# Patient Record
Sex: FEMALE | Race: WHITE
[De-identification: names, ages, dates, MRNs, and addresses within clinical notes are randomized per-mention and may not be internally consistent; named-entity substitution may affect disease eponyms.]

---

## 2018-10-16 ENCOUNTER — HOSPITAL ENCOUNTER (EMERGENCY)
Dept: HOSPITAL 25 - UCCORT | Age: 23
Discharge: HOME | End: 2018-10-16
Payer: COMMERCIAL

## 2018-10-16 VITALS — SYSTOLIC BLOOD PRESSURE: 109 MMHG | DIASTOLIC BLOOD PRESSURE: 63 MMHG

## 2018-10-16 DIAGNOSIS — Z88.1: ICD-10-CM
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
## 2018-10-16 DIAGNOSIS — Z91.013: ICD-10-CM

## 2018-10-16 DIAGNOSIS — S00.01XA: Primary | ICD-10-CM

## 2018-10-16 DIAGNOSIS — F41.9: ICD-10-CM

## 2018-10-16 DIAGNOSIS — F31.9: ICD-10-CM

## 2018-10-16 DIAGNOSIS — Z91.040: ICD-10-CM

## 2018-10-16 DIAGNOSIS — F17.210: ICD-10-CM

## 2018-10-16 DIAGNOSIS — L23.1: ICD-10-CM

## 2018-10-16 PROCEDURE — 99212 OFFICE O/P EST SF 10 MIN: CPT

## 2018-10-16 PROCEDURE — G0463 HOSPITAL OUTPT CLINIC VISIT: HCPCS
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
## 2018-11-23 ENCOUNTER — HOSPITAL ENCOUNTER (EMERGENCY)
Dept: HOSPITAL 25 - UCCORT | Age: 23
Discharge: ANOTHER HOSPITAL | End: 2018-11-23
Payer: SELF-PAY

## 2018-11-23 VITALS — SYSTOLIC BLOOD PRESSURE: 102 MMHG | DIASTOLIC BLOOD PRESSURE: 77 MMHG

## 2018-11-23 DIAGNOSIS — Z91.013: ICD-10-CM

## 2018-11-23 DIAGNOSIS — F41.0: Primary | ICD-10-CM

## 2018-11-23 DIAGNOSIS — R10.9: ICD-10-CM

## 2018-11-23 DIAGNOSIS — R11.10: ICD-10-CM

## 2018-11-23 DIAGNOSIS — F17.210: ICD-10-CM

## 2018-11-23 DIAGNOSIS — Z91.09: ICD-10-CM

## 2018-11-23 DIAGNOSIS — Z88.1: ICD-10-CM

## 2018-11-23 DIAGNOSIS — Z79.899: ICD-10-CM

## 2018-11-23 DIAGNOSIS — Z91.040: ICD-10-CM

## 2018-11-23 PROCEDURE — G0463 HOSPITAL OUTPT CLINIC VISIT: HCPCS

## 2018-11-23 PROCEDURE — 99215 OFFICE O/P EST HI 40 MIN: CPT

## 2018-11-23 NOTE — UC
Psychiatric Complaint HPI





- HPI Summary


HPI Summary: 





pt c/o severe anxiety and panic attack.


states to myself she awoke this way.


hx same.


reports vomiting all week  and abdominal pain during triage.


i am unable to get more hx due to pt's condition.





- History Of Current Complaint


Stated Complaint: ANXIETY


Time Seen by Provider: 11/23/18 10:09


Hx Obtained From: Patient, Family/Caretaker - boyfriend


Hx Last Menstrual Period: SHE CANT REMEMBER


Onset/Duration: Sudden Onset


Timing: Constant


Aggravating Factor(s): Nothing, Other - boyfriend denies any specific trigger


Alleviating Factor(s): Nothing


Related History: Positive For: Prior Psychiatric Issues





- Allergies/Home Medications


Allergies/Adverse Reactions: 


 Allergies











Allergy/AdvReac Type Severity Reaction Status Date / Time


 


Adhesive Tape Allergy Intermediate SKIN SWELLS Verified 11/23/18 10:03


 


azithromycin Allergy  Vomiting Verified 11/23/18 10:03


 


latex Allergy  Swelling Verified 11/23/18 10:03


 


shellfish derived Allergy  Wheezing Verified 11/23/18 10:03











Home Medications: 


 Home Medications





ARIPiprazole TAB* [Abilify  TAB*] 2 mg PO DAILY 11/23/18 [History Confirmed 11/ 23/18]











PMH/Surg Hx/FS Hx/Imm Hx


Psychological History: Anxiety, Other - panic attack, serotonin syndrom





- Surgical History


Surgical History: Yes


Surgery Procedure, Year, and Place: 15 GI bx r/t c.diff





- Family History


Known Family History: Positive: None, Unknown, Other - postive FMH of contusions


   Negative: Cardiac Disease, Hypertension, Diabetes


Family History: no known family history of cardio vascular disorders





- Social History


Alcohol Use: None


Substance Use Type: Prescribed


Smoking Status (MU): Heavy Every Day Tobacco Smoker


Type: Cigarettes


Amount Used/How Often: 1/2 PPD


Length of Time of Smoking/Using Tobacco: age 12


Have You Smoked in the Last Year: Yes


Household Exposure Type: Cigarettes





- Immunization History


Most Recent Influenza Vaccination: none


Most Recent Tetanus Shot: AGE 20





Review of Systems


All Other Systems Reviewed And Are Negative: No


Gastrointestinal: Positive: Abdominal Pain, Vomiting


Psychological: Positive: Anxious, Other - panic





- Comments


Additional Review of Systems Comments: 





limited by pt conditon , screaming, crying, vomiting and unable to focus/answer 

questions





Physical Exam


Triage Information Reviewed: Yes


Appearance: Other: - anxious, screaming, crying, seated bent over rocking in 

chair.


Vital Signs: 


 Initial Vital Signs











Temp  97.7 F   11/23/18 10:05


 


Pulse  100   11/23/18 10:05


 


Resp  20   11/23/18 10:05


 


BP  125/74   11/23/18 10:05


 


Pulse Ox  100   11/23/18 10:05











Vital Signs Reviewed: Yes


Eyes: Positive: Conjunctiva Clear


ENT: Positive: Normal ENT inspection


Neck: Positive: Supple, Nontender


Respiratory: Positive: Lungs clear, Normal breath sounds


Cardiovascular: Positive: RRR, No Murmur


Abdomen Description: Positive: Nontender, No Organomegaly, Soft, Other: - 

vomiting into bucket.  Negative: Distended, Guarding


Bowel Sounds: Positive: Present


Neurological: Positive: Alert


Psychological: Positive: Decreased Age Appropriate Behavior - screaming, crying

, rocking in chair and unable to focus


Skin Exam: Normal





Psych Complaint Course/Dx





- Course


Course Of Treatment: Report given to Dr Stout at [facility] er, coming via [facility]





- Differential Dx/Diagnosis


Provider Diagnoses: anxiety, panic, vomiting





Discharge





- Sign-Out/Discharge


Documenting (check all that apply): Patient Departure


All imaging exams completed and their final reports reviewed: No Studies





- Discharge Plan


Condition: Stable


Disposition: TRANS HIGHER LVL OF CARE FAC


Referrals: 


Abdulkadir Corado MD [Primary Care Provider] - 





- Billing Disposition and Condition


Condition: STABLE


Disposition: Trans Higher Lvl of Care Fac





- Attestation Statements


Provider Attestation: 





Per institutional requirements, I have reviewed the chart, however, I was not 

consulted specifically or made aware of this patient by the  midlevel provider.

  I did not personally evaluate, interact with , or disposition  this patient.

## 2018-12-15 ENCOUNTER — HOSPITAL ENCOUNTER (EMERGENCY)
Dept: HOSPITAL 25 - UCCORT | Age: 23
Discharge: HOME | End: 2018-12-15
Payer: COMMERCIAL

## 2018-12-15 VITALS — DIASTOLIC BLOOD PRESSURE: 68 MMHG | SYSTOLIC BLOOD PRESSURE: 102 MMHG

## 2018-12-15 DIAGNOSIS — J01.00: Primary | ICD-10-CM

## 2018-12-15 DIAGNOSIS — F17.210: ICD-10-CM

## 2018-12-15 DIAGNOSIS — Z88.1: ICD-10-CM

## 2018-12-15 PROCEDURE — 99212 OFFICE O/P EST SF 10 MIN: CPT

## 2018-12-15 PROCEDURE — G0463 HOSPITAL OUTPT CLINIC VISIT: HCPCS

## 2018-12-15 NOTE — UC
Throat Pain/Nasal Jose Francisco HPI





- HPI Summary


HPI Summary: 


23-year-old female presents with one-week history of maxillary sinus pain, 

nasal congestion, and sore throat.  States over past 2 days she has developed 

greenish nasal discharge that is occasionally blood-tinged.  Last night had 

chills, night sweats, and generalized myalgias. No known fever.  Denies ear pain

, cough, chest pain, shortness of breath, abdominal pain, nausea, or vomiting.








- History of Current Complaint


Chief Complaint: UCGeneralIllness


Stated Complaint: SINUS COMPLAINT,LEFT ARM TINGLING


Time Seen by Provider: 12/15/18 11:54


Hx Obtained From: Patient


Hx Last Menstrual Period: 12/10/18


Pain Intensity: 4





- Allergies/Home Medications


Allergies/Adverse Reactions: 


 Allergies











Allergy/AdvReac Type Severity Reaction Status Date / Time


 


Adhesive Tape Allergy Intermediate SKIN SWELLS Verified 12/15/18 11:28


 


azithromycin Allergy  Vomiting Verified 12/15/18 11:28


 


latex Allergy  Swelling Verified 12/15/18 11:28


 


shellfish derived Allergy  Wheezing Verified 12/15/18 11:28











Home Medications: 


 Home Medications





QUEtiapine TAB* [Seroquel 25 MG TAB*] 12.5 mg PO BEDTIME 12/15/18 [History 

Confirmed 12/15/18]











PMH/Surg Hx/FS Hx/Imm Hx


Psychological History: Anxiety, Depression, Other - ADHD





- Surgical History


Surgical History: Yes


Surgery Procedure, Year, and Place: 15 GI bx r/t c.diff





- Family History


Known Family History: Positive: Non-Contributory





- Social History


Occupation: Employed Full-time


Lives: Alone


Alcohol Use: None


Substance Use Type: Prescribed


Smoking Status (MU): Heavy Every Day Tobacco Smoker


Type: Cigarettes


Amount Used/How Often: 1/2 PPD


Length of Time of Smoking/Using Tobacco: age 12


Have You Smoked in the Last Year: Yes


Household Exposure Type: Cigarettes





- Immunization History


Most Recent Influenza Vaccination: none


Most Recent Tetanus Shot: AGE 20





Review of Systems


All Other Systems Reviewed And Are Negative: Yes


Constitutional: Positive: Chills, Fatigue.  Negative: Fever


Skin: Negative: Rash


Eyes: Negative: Drainage, Eye Redness


ENT: Positive: Sore Throat, Nasal Discharge, Sinus Congestion, Sinus Pain/

Tenderness.  Negative: Ear Ache


Respiratory: Negative: Shortness Of Breath, Cough


Cardiovascular: Negative: Palpitations, Chest Pain


Is Patient Immunocompromised?: No





Physical Exam





- Summary


Physical Exam Summary: 


GENERAL APPEARANCE: Well developed, well nourished, alert and cooperative, and 

appears to be in no acute distress.





EYES: Conjunctiva clear. No discharge.





EARS: External auditory canals and tympanic membranes clear, hearing grossly 

intact.





NOSE: Mild nasal congestion. No discharge. Maxillary sinus tenderness.





THROAT: Oral cavity and pharynx normal. No tonsilar inflammation, swelling or 

exudate. Teeth and gingiva in good general condition.





NECK: Neck supple, non-tender without lymphadenopathy.





CARDIAC: Normal S1 and S2. No S3, S4 or murmurs. Rhythm is regular. There is no 

peripheral edema, cyanosis or pallor. Extremities are warm and well perfused. 

Capillary refill is less than 2 seconds.





LUNGS: Clear to auscultation and percussion without rales, rhonchi, wheezing or 

diminished breath sounds.





ABDOMEN: Positive bowel sounds. Soft, nondistended, nontender. No guarding or 

rebound. No masses or hepatosplenomegally.





SKIN: Skin normal color, texture and turgor with no lesions or eruptions.





Triage Information Reviewed: Yes


Vital Signs: 


 Initial Vital Signs











Temp  99.4 F   12/15/18 11:24


 


Pulse  111   12/15/18 11:24


 


Resp  20   12/15/18 11:24


 


BP  102/68   12/15/18 11:24


 


Pulse Ox  98   12/15/18 11:24











Vital Signs Reviewed: Yes





Throat Pain/Nasal Course/Dx





- Course


Course Of Treatment: 23-year-old female presents with one-week history of 

maxillary sinus pain, nasal congestion, and sore throat.  States over past 2 

days she has developed greenish nasal discharge that is occasionally blood-

tinged.  Last night had chills, night sweats, and generalized myalgias. No 

known fever.  Denies ear pain, cough, chest pain, shortness of breath, 

abdominal pain, nausea, or vomiting. Afebrile. VSS. Exam consistent with an 

acute maxillary sinusitis. With her reported fever, will treat for bacterial 

sinusitis with Augmentin 875 mg BID x 10 days as well as symptomatic treatment. 

She is to follow up with her PCP in 7 days if symptoms persist. Warning 

symptoms reviewed. Verbalizes understanding and agrees with POC.





- Differential Dx/Diagnosis


Differential Diagnosis/HQI/PQRI: Pharyngitis, Sinusitis, Tonsillitis, URI


Provider Diagnosis: 


 Acute maxillary sinusitis








Discharge





- Sign-Out/Discharge


Documenting (check all that apply): Patient Departure


All imaging exams completed and their final reports reviewed: No Studies





- Discharge Plan


Condition: Stable


Disposition: HOME


Prescriptions: 


Amoxicillin/Clavulanate TAB* [Augmentin *] 875 mg PO BID #20 tab


Fluticasone NASAL SPRAY 50MCG* [Flonase NASAL SPRAY 50MCG*] 2 spray BOTH NARES 

DAILY #1 btl


Patient Education Materials:  Sinusitis (ED)


Referrals: 


Abdulkadir Corado MD [Primary Care Provider] - 7 Days (If no improvement.)


Additional Instructions: 


Your history and exam are consistent with a sinus infection. With the onset of 

fever this morning we will start you on an antibiotic to treat for the 

infection.





Start Augmentin 1 tab twice a day for 10 days. Take with food to avoid upset 

stomach. Be sure to complete the entire prescription even if feeling better.





Drink plenty of fluids to avoid dehydration especially if you are running any 

fever.





Use a saline rinse kit such as Neti Pot or NeilMed at least twice a day to help 

thin secretions and promote drainage of the sinuses.





Use fluticasone (Flonase) nasal spray 2 sprays each nostril once daily.





Use an over the counter decongestant such as Sudafed according to directions to 

help with congestion.





Take over the counter acetaminophen (Tylenol) or ibuprofen (Advil, Motrin) 

according to directions as needed for pain or fever.





Follow up with your primary care provider in 7 days if symptoms persist.





Seek immediate medical attention in the emergency room if you have fever 

greater than 100.5 F despite taking acetaminophen or ibuprofen, have chest pain

, difficulty breathing, are unable to swallow, or have any worsening of 

symptoms.





- Billing Disposition and Condition


Condition: STABLE


Disposition: Home

## 2019-04-13 ENCOUNTER — HOSPITAL ENCOUNTER (EMERGENCY)
Dept: HOSPITAL 25 - UCCORT | Age: 24
Discharge: HOME | End: 2019-04-13
Payer: COMMERCIAL

## 2019-04-13 VITALS — DIASTOLIC BLOOD PRESSURE: 73 MMHG | SYSTOLIC BLOOD PRESSURE: 127 MMHG

## 2019-04-13 DIAGNOSIS — H66.92: ICD-10-CM

## 2019-04-13 DIAGNOSIS — Z88.1: ICD-10-CM

## 2019-04-13 DIAGNOSIS — Z91.09: ICD-10-CM

## 2019-04-13 DIAGNOSIS — F17.210: ICD-10-CM

## 2019-04-13 DIAGNOSIS — Z91.040: ICD-10-CM

## 2019-04-13 DIAGNOSIS — H60.91: Primary | ICD-10-CM

## 2019-04-13 DIAGNOSIS — F90.9: ICD-10-CM

## 2019-04-13 DIAGNOSIS — Z91.013: ICD-10-CM

## 2019-04-13 PROCEDURE — 99213 OFFICE O/P EST LOW 20 MIN: CPT

## 2019-04-13 PROCEDURE — G0463 HOSPITAL OUTPT CLINIC VISIT: HCPCS

## 2019-04-13 NOTE — UC
Throat Pain/Nasal Jose Francisco HPI





- HPI Summary


HPI Summary: 





23 yo female presents with right ear pain with drainage and sore throat since 

this morning. She tells me that this morning her right ear felt a little painful

, but by middays the pain was much worse and she noticed some drainage from the 

ear. Pain radiates down her right jaw. She has been taking tylenol/ibuprofen 

for discomfort. Denies fever, chills, sinus symptoms, cough, SOB, chest pain. 





- History of Current Complaint


Stated Complaint: RIGHT EAR PAIN/DRAINAGE


Time Seen by Provider: 04/13/19 20:04


Hx Obtained From: Patient


Hx Last Menstrual Period: 12/10/18


Onset/Duration: Gradual Onset


Severity: Severe


Pain Intensity: 10


Pain Scale Used: 0-10 Numeric





- Allergies/Home Medications


Allergies/Adverse Reactions: 


 Allergies











Allergy/AdvReac Type Severity Reaction Status Date / Time


 


Adhesive Tape Allergy Intermediate SKIN SWELLS Verified 04/13/19 20:01


 


azithromycin Allergy  Vomiting Verified 04/13/19 20:01


 


latex Allergy  Swelling Verified 04/13/19 20:01


 


shellfish derived Allergy  Wheezing Verified 04/13/19 20:01











Home Medications: 


 Home Medications





guanFACINE TAB* [Tenex TAB*] 0.5 mg BID 04/13/19 [History Confirmed 04/13/19]











PMH/Surg Hx/FS Hx/Imm Hx





- Additional Past Medical History


Additional PMH: 





ADHD





Psychological History: Anxiety, Depression





- Surgical History


Surgical History: Yes


Surgery Procedure, Year, and Place: 15 GI bx r/t c.diff





- Family History


Known Family History: 


   Negative: Cardiac Disease, Hypertension, Diabetes





- Social History


Occupation: Employed Full-time


Lives: With Family


Alcohol Use: None


Substance Use Type: Prescribed


Smoking Status (MU): Heavy Every Day Tobacco Smoker


Type: Cigarettes


Amount Used/How Often: 1/2 PPD


Length of Time of Smoking/Using Tobacco: age 12


Have You Smoked in the Last Year: Yes


Household Exposure Type: Cigarettes





- Immunization History


Most Recent Influenza Vaccination: none


Most Recent Tetanus Shot: AGE 20





Review of Systems


All Other Systems Reviewed And Are Negative: Yes


Constitutional: Positive: Negative


Skin: Positive: Negative


Eyes: Positive: Negative


ENT: Positive: Sore Throat, Ear Ache


Respiratory: Positive: Negative


Cardiovascular: Positive: Negative


Gastrointestinal: Positive: Negative


Neurovascular: Positive: Negative


Psychological: Positive: Negative





Physical Exam





- Summary


Physical Exam Summary: 





GENERAL: NAD. WDWN. No pain distress.


SKIN: No rashes, sores, lesions, or open wounds.


HEENT:


            Head: AT/NC


            Eyes: EOM intact. Conjunctiva clear without inflammation or 

discharge.


            Ears: Hearing grossly normal. LEFT TM with mild erythema and 

bulging. No canal edema or drainage. RIGHT TM with mild canal edema and white/

yellow purulent drainage. TM appears intact. Mild pain with auricle 

manipulation. No mastoiditis. 


            Nose: Nasal mucosa pink and moist. NTTP maxillary and frontal 

sinus. 


            Throat: Posterior oropharynx mild erythema. No exudates or 

tonsillar enlargement.  Uvula midline.


NECK: Supple. Nontender. No lymphadenopathy. 


CHEST:  CTAB. No r/r/w. No accessory muscle use. Breathing comfortably and in 

no distress.


CV:  RRR. Without m/r/g. Pulses intact. 


NEURO: Alert. 


PSYCH: Age appropriate behavior.


Triage Information Reviewed: Yes


Vital Signs: 





Vital Signs:











Temp Pulse Resp BP Pulse Ox


 


 100 F   99   16   127/73   100 


 


 04/13/19 20:02  04/13/19 20:02  04/13/19 20:02  04/13/19 20:02  04/13/19 20:02











Vital Signs Reviewed: Yes





Throat Pain/Nasal Course/Dx





- Course


Course Of Treatment: 





Otitis media left with Otitis externa on right. In the clinic pt was given 

ibuprofen, augmentin, and cipro eye drops to be used in the ear. Rx for these 

sent to pharmacy. Advised to apply a cool compress to the ear to reduce pain. F/

u if symptoms do not improve. 





- Differential Dx/Diagnosis


Provider Diagnosis: 


 Otitis media, left, Otitis externa of right ear








Discharge





- Sign-Out/Discharge


Documenting (check all that apply): Patient Departure


All imaging exams completed and their final reports reviewed: No Studies





- Discharge Plan


Condition: Stable


Disposition: HOME


Prescriptions: 


Amoxicillin/Clavulanate TAB* [Augmentin *] 875 mg PO BID #14 tab


Ofloxacin 0.3% (Ear Drop)* [Floxin 0.3% OTIC.SOL (Ear Drop)] 5 drop RIGHT EAR 

BID #1 btl


Patient Education Materials:  Otitis Externa (ED), Ear Infection (ED)


Referrals: 


Taylor Perry PA [Primary Care Provider] - 


Additional Instructions: 


If you develop a fever, shortness of breath, chest pain, new or worsening 

symptoms - please call your PCP or go to the ED.


 








- Billing Disposition and Condition


Condition: STABLE


Disposition: Home

## 2019-07-19 ENCOUNTER — HOSPITAL ENCOUNTER (EMERGENCY)
Dept: HOSPITAL 25 - UCCORT | Age: 24
Discharge: HOME | End: 2019-07-19
Payer: COMMERCIAL

## 2019-07-19 VITALS — SYSTOLIC BLOOD PRESSURE: 109 MMHG | DIASTOLIC BLOOD PRESSURE: 71 MMHG

## 2019-07-19 DIAGNOSIS — F17.210: ICD-10-CM

## 2019-07-19 DIAGNOSIS — Z87.440: ICD-10-CM

## 2019-07-19 DIAGNOSIS — Z88.1: ICD-10-CM

## 2019-07-19 DIAGNOSIS — R30.0: Primary | ICD-10-CM

## 2019-07-19 PROCEDURE — 81003 URINALYSIS AUTO W/O SCOPE: CPT

## 2019-07-19 PROCEDURE — G0463 HOSPITAL OUTPT CLINIC VISIT: HCPCS

## 2019-07-19 PROCEDURE — 99212 OFFICE O/P EST SF 10 MIN: CPT

## 2019-07-19 PROCEDURE — 84702 CHORIONIC GONADOTROPIN TEST: CPT

## 2019-07-19 PROCEDURE — 87086 URINE CULTURE/COLONY COUNT: CPT

## 2019-07-19 NOTE — UC
Complaint Female HPI





- HPI Summary


HPI Summary: 


Per RN triage: "urinary urgency, small amounts, hurts at the end of urination, 

"weird vaginal spotting" 


-here w/ her young dtr w/ autism. 


-she has had many UTIs even when she was younger bc she was a swimmer. has had 

cystitis and kidney infections w/ gross hematuria with them in past. she does 

not recall what abx she uses. has been some time since she had one


+ body aches


-no n/v. no rash





- History Of Current Complaint


Chief Complaint: UCGU


Stated Complaint: URINARY


Time Seen by Provider: 07/19/19 17:12


Hx Last Menstrual Period: 7/1/19


Pain Intensity: 0





- Allergies/Home Medications


Allergies/Adverse Reactions: 


 Allergies











Allergy/AdvReac Type Severity Reaction Status Date / Time


 


Adhesive Tape Allergy Intermediate SKIN SWELLS Verified 07/19/19 17:07


 


azithromycin Allergy  Vomiting Verified 07/19/19 17:07


 


latex Allergy  Swelling Verified 07/19/19 17:07


 


shellfish derived Allergy  Wheezing Verified 07/19/19 17:07














PMH/Surg Hx/FS Hx/Imm Hx


Previously Healthy: Yes





- Surgical History


Surgical History: Yes


Surgery Procedure, Year, and Place: 15 GI bx r/t c.diff





- Family History


Known Family History: 


   Negative: Cardiac Disease, Hypertension, Diabetes





- Social History


Alcohol Use: None


Substance Use Type: None


Smoking Status (MU): Heavy Every Day Tobacco Smoker


Type: Cigarettes


Amount Used/How Often: 1/2 PPD


Length of Time of Smoking/Using Tobacco: age 12


Have You Smoked in the Last Year: Yes


Household Exposure Type: Cigarettes





- Immunization History


Most Recent Influenza Vaccination: none


Most Recent Tetanus Shot: AGE 20





Review of Systems


All Other Systems Reviewed And Are Negative: Yes


Constitutional: Positive: Negative


Skin: Positive: Negative


Eyes: Positive: Negative


ENT: Positive: Negative


Respiratory: Positive: Negative


Cardiovascular: Positive: Negative


Gastrointestinal: Positive: Negative


Genitourinary: Positive: Dysuria, Frequency, Urgency


Motor: Positive: Negative


Neurovascular: Positive: Negative


Musculoskeletal: Positive: Negative


Neurological: Positive: Negative


Psychological: Positive: Negative


Is Patient Immunocompromised?: No





Physical Exam


Triage Information Reviewed: Yes


Appearance: Well-Appearing, No Pain Distress, Well-Nourished - very pleasant


Vital Signs: 


 Initial Vital Signs











Temp  98.9 F   07/19/19 17:00


 


Pulse  92   07/19/19 17:00


 


Resp  16   07/19/19 17:00


 


BP  109/71   07/19/19 17:00


 


Pulse Ox  100   07/19/19 17:00











Vital Signs Reviewed: Yes


Eye Exam: Normal


ENT Exam: Normal


Neck exam: Normal


Neck: Positive: Supple, Nontender, No Lymphadenopathy


Respiratory Exam: Normal


Respiratory: Positive: Lungs clear, Normal breath sounds, No respiratory 

distress, No accessory muscle use


Cardiovascular Exam: Normal


Cardiovascular: Positive: RRR


Abdomen Description: Positive: Soft, Other: - mild suprapubic tenderness.  

Negative: CVA Tenderness (R), CVA Tenderness (L), Distended, Guarding


Bowel Sounds: Positive: Present


Musculoskeletal Exam: Normal


Neurological Exam: Normal


Psychological Exam: Normal


Skin Exam: Normal





 Complaint Female Dx





- Course


Course Of Treatment: 





UA abnml w/ positive UTI sx and historically feels like prev UTIs. 


-ER with worsening sx, f/c body aches





- Differential Dx/Diagnosis


Differential Diagnosis/HQI/PQRI: Ureteral Stone, Urinary Tract Infection


Provider Diagnosis: 


 Dysuria








Discharge





- Sign-Out/Discharge


Documenting (check all that apply): Patient Departure


All imaging exams completed and their final reports reviewed: No Studies





- Discharge Plan


Condition: Stable


Disposition: HOME


Prescriptions: 


Nitrofurantoin Monohyd/M-Cryst [Macrobid 100 mg Capsule] 100 mg PO BID 7 Days #

14 cap


Patient Education Materials:  Urinary Tract Infection in Women (DC)


Referrals: 


Taylor Perry PA [Primary Care Provider] - 


Additional Instructions: 


Please make sure to go to the ER if you develop fevers/chills or worsening 

symptoms. 





- Billing Disposition and Condition


Condition: STABLE


Disposition: Home

## 2019-09-30 ENCOUNTER — HOSPITAL ENCOUNTER (EMERGENCY)
Dept: HOSPITAL 25 - UCCORT | Age: 24
Discharge: HOME | End: 2019-09-30
Payer: COMMERCIAL

## 2019-09-30 VITALS — DIASTOLIC BLOOD PRESSURE: 63 MMHG | SYSTOLIC BLOOD PRESSURE: 106 MMHG

## 2019-09-30 DIAGNOSIS — Z88.1: ICD-10-CM

## 2019-09-30 DIAGNOSIS — F17.210: ICD-10-CM

## 2019-09-30 DIAGNOSIS — R10.11: Primary | ICD-10-CM

## 2019-09-30 DIAGNOSIS — Z87.442: ICD-10-CM

## 2019-09-30 DIAGNOSIS — R10.2: ICD-10-CM

## 2019-09-30 PROCEDURE — G0463 HOSPITAL OUTPT CLINIC VISIT: HCPCS

## 2019-09-30 PROCEDURE — 99212 OFFICE O/P EST SF 10 MIN: CPT

## 2019-09-30 PROCEDURE — 84702 CHORIONIC GONADOTROPIN TEST: CPT

## 2019-09-30 PROCEDURE — 81003 URINALYSIS AUTO W/O SCOPE: CPT

## 2019-09-30 NOTE — XMS REPORT
Continuity of Care Document (CCD)

 Created on:2019



Patient:Donna Munoz

Sex:Female

:1995

External Reference #:[id number]





Demographics







 Address  [address] [place] [address]

 

 Home Phone  [phone]

 

 Mobile Phone  [phone]

 

 Preferred Language  en

 

 Marital Status  Not  or 

 

 Yazidism Affiliation  Unknown

 

 Race  White

 

 Ethnic Group  Not  or 









Author







 Name  Taylor Perry PA

 

 Address  [address] [place] [address]









Care Team Providers







 Name  Role  Phone

 

 Taylor Perry PA - Medical  Care Team Information   [phone]









Problems







 Active Problems  Provider  Date

 

 Allergic rhinitis  Taylor Perry PA  Onset: 2018

 

 Exercise-induced asthma  Taylor Perry PA  Onset: 2018

 

 Tobacco user  Taylor Perry PA  Onset: 2018

 

 Anxiety state  Taylor Perry PA  Onset: 2018

 

 Bipolar disorder  Taylor Perry PA  Onset: 2018

 

 Abdominal pain  Sadi Sanchez MD  Onset: 2018

 

 Gastrointestinal tract finding  Sadi Sanchez MD  Onset: 2018

 

 Nausea and vomiting  Sadi Sanchez MD  Onset: 2018







Social History







 Type  Date  Description  Comments

 

 Birth Sex    Unknown  

 

 Tobacco Use  Start: Unknown  currently smokes 1/2 Pack  



     Daily  

 

 Smoking Status  Reviewed: 19  currently smokes 1/2 Pack  



     Daily  

 

 Smokeless Tobacco    Never Used Smokeless  



     Tobacco  

 

 ETOH Use    Has consumed alcohol in  sober since 2017



     the past  

 

 Recreational Drug Use    Formerly used Barbiturates  



     sporadically  

 

 Tobacco Use  Start: Unknown  Patient is a current  1/2 ppd



     smoker, smokes every day  

 

 Recreational Drug Use    Marijuana  

 

 Enjoy Exercising    Does not enjoy exercising  

 

 Tattoo/Piercing    Pierced ears  

 

 Tattoo/Piercing    Pierced Nasal Area  

 

 Tattoo/Piercing    Pierced Navel  







Allergies, Adverse Reactions, Alerts







 Active Allergies  Reaction  Severity  Comments  Date

 

 Estrogens      facial numbness/migraine aura  2011

 

 Progesterone      facial numbness, migraine aura  2011







Medications







 Active Medications  SIG  Qnty  Indications  Ordering  Date



         Provider  

 

 Hyoscyamine Sulfate  take 1 tablet by  30tabs  K52.9  Nisha Coffman,  2019



   mouth every 4      MD  



 0.125mg Tablets  hours as needed        



   for abnormal        



   function of the        



   digestive system        

 

 Clonazepam  1 tab by mouth  90tabs    Nisha Coffman,  2019



          0.5mg  three times a day      MD  



 Tablets  as needed for        



   panic . temporary        



   sig change ref        



   #[id number]        

 

 Quetiapine Fumarate  take 1 tablet by  30tabs  F31.9  Nisha Coffman,  2019



   mouth at bedtime      MD  



 100mg Tablets          



           

 

 Guanfacine HCL  1/2 by mouth twice  30tabs  F31.9  Nisha Coffman,  2019



              1mg  a day      MD  



 Tablets          



           

 

 Citalopram  1 by mouth every  30tabs  F31.9  Nisha Coffman,  2019



 Hydrobromide  day      MD  



            20mg          



 Tablets          



           

 

 Ventolin HFA  2 puffs every 4  18gm  J45.990  Teri Van,  2018



   hours as needed      M.D.  



 108(90Base) mcg/Act  for cough and        



 Aerosol  wheeze        



           









 History Medications









 Methylprednisolone  take tablets as  21units  K52.9  Augustus,  2019 -



                4mg TBPK  directed-dose pack      MD Nisha  2019



           

 

 Magnesium  1 by mouth every  90tabs  G43.101  [facility]  2019 -



       400mg Tablets  day for the      MD Nisha  2019



   prevention of        



   headache        

 

 Vitamin B-2  1 by mouth every  90tabs  G43.101  [facility]  2019 -



         100mg Tablets  day for prevention      MD Nisha  2019



   of headache        

 

 Diflucan  take 1 tablet by  2tabs    Zulma Muller,  2019 -



      150mg Tablets  mouth one time.      FNP  2019



   may repeat in 1        



   week if necessary.        

 

 Clonazepam  1 tab by mouth  75tabs    [facility]  2019 -



        0.5mg Tablets  every morning, 1      MD Nisha  2019



 Dispers  1/2 every evenning        



   Reference        



   #[id number]        







Immunizations







 Description

 

 No Information Available







Vital Signs







 Date  Vital  Result  Comment

 

 2019  2:37pm  BP Systolic  118 mmHg  









 BP Diastolic  64 mmHg  

 

 Body Temperature  99.2 F  

 

 Heart Rate  96 /min  

 

 Respiratory Rate  17 /min  

 

 Height  65.5 inches  5'5.50"

 

 Weight  135.44 lb  

 

 BMI (Body Mass Index)  22.2 kg/m2  

 

 BSA (Body Surface Area)  1.69 m2  

 

 Ideal body weight in kilograms  58 kg  

 

 O2 % BldC Oximetry  98 %  









 2019  1:50pm  BP Systolic  98 mmHg  









 BP Diastolic  60 mmHg  

 

 Body Temperature  98.5 F  

 

 Heart Rate  92 /min  

 

 Respiratory Rate  18 /min  

 

 Height  63 inches  5'3"

 

 Weight  137.50 lb  

 

 BMI (Body Mass Index)  24.4 kg/m2  

 

 BSA (Body Surface Area)  1.65 m2  

 

 Ideal body weight in kilograms  52 kg  

 

 O2 % BldC Oximetry  97 %  







Results







 Test  Date  Facility  Test  Result  H/L  Range  Note

 

 Urine Culture And    [facility]  Urine  SEE 
RESULT      1, 2



 Sensitivities  3 [phone]  Culture  BELOW      

 

 Laboratory test    [facility]  Poc  Negative    
Negative  3



 finding  3 [phone]  Pregnancy,        



       Urine        

 

 Poc Urinalysis    [facility]  Poc Glucose,  
Negative    Negative  



   5 [phone]  Urine        









 Poc Bilirubin, Urine  1+  Abnormal  Negative  

 

 Poc Ketone, Urine  Trace  Abnormal  Negative  

 

 Poc Specific Gravity, Urine  1.025  Normal  1.010-1.030  

 

 Poc Blood, Urine  3+  Abnormal  Negative  

 

 Poc pH, Urine  6.5  Normal  5-9  

 

 Poc Protein, Urine  2+  Abnormal  Negative  

 

 Poc Urobilinogen, Urine  1.0    Negative  

 

 Poc Nitrite, Urine  Negative    Negative  

 

 Poc Leukocytes, Urine  1+  Abnormal  Negative  

 

 Poc Color, Urine  Yellow      

 

 Poc Clarity, Urine  Clear      4









 CBC W/Automated  2019  [facility]  White Blood  7.7 K/uL  Normal  3.1-10.7  5



 Diff    [address]  Count        



     [place], [place] [id number] [phone]          









 Red Blood Count  4.68 M/uL  Normal  3.90-5.40  

 

 Hemoglobin  14.1 gm/dL  Normal  11.6-15.8  

 

 Hematocrit  42.1 %  Normal  36.0-46.1  

 

 Mean Cell Volume  90.0 fl  Normal  80.9-99.0  

 

 Mean Corpuscular HGB  30.1 pg  Normal  25.9-32.7  

 

 Mean Corpuscular HGB Conc  33.5 g/dL  Normal  30.8-34.3  

 

 Platelet Count  267 K/uL  Normal  155-360  

 

 Red Cell Distri Width SD  42.5 fl  Normal  36-47  

 

 Red Cell Distri Width %CV  12.9 %  Normal  11.7-14.4  

 

 Mean Platelet Volume  10.0 fl  Normal  8.9-12.4  

 

 Neut%  62.0 %  Normal  40.4-72.8  

 

 Lymph %  27.0 %  Normal  20.0-42.0  

 

 Mono %  8.0 %  Normal  4.3-13.2  

 

 Eo%  2.1 %  Normal  0.0-6.6  

 

 Bas%  0.4 %  Normal  0.0-1.1  

 

 Immature Grans  0.5 %  Normal  0.0-5.0  

 

 NRBC %  0.0 /100WBC    < 10/ 100 WBC  

 

 Neut#  4.78 K/uL  Normal  1.8-7.0  

 

 Lymph #  2.08 K/uL  Normal  1.0-4.0  

 

 Mono #  0.62 K/uL  Normal  0.3-0.9  

 

 Eos #  0.16 K/uL  Normal  0.0-0.5  

 

 Baso #  0.03 K/uL  Normal  0.0-0.1  

 

 Immature Grans Absolute  0.04 K/uL      

 

 NRBC #  0.00 K/uL      









 Ua RFX Micro & Culture  2019  [facility]  Urine Color  YELLOW    Yellow  



 II    [address] [place] [address] [phone]          









 Urine Clarity  CLEAR    Clear  

 

 Urine Glucose - Dipstick  NEGATIVE mg/dL    Negative  

 

 Urine Bilirubin - Dipstick  NEGATIVE    Negative  

 

 Urine Ketone  NEGATIVE mg/dL    Negative  

 

 Urine Specific Gravity  1.020  Normal  1.010-1.030  

 

 Urine Blood  NEGATIVE    Negative  

 

 Urine PH  7.5  Normal  6.5-7.5  

 

 Urine Protein - Dipstick  NEGATIVE mg/dL    Negative  

 

 Urine Urobilinogen - Dipstick  0.2 E.U./dL  Normal  0.2-1.0  

 

 Urine Nitrite - Dipstick  NEGATIVE    Negative  

 

 Urine Leuk Esterase  NEGATIVE    Negative  

 

 Source:  URINE, CLEAN CAT <SEE NOTE>      6









 Chlam/GC/Trichomonas  2019  [facility]  Ur Trichomonas  NEGATIVE    Negative  



 PCR, Ur    [address]  vaginalis,PCR        



     [place] [address] [phone]          









 Ur Chlamydia trachomatis,PCR  NEGATIVE    Negative  

 

 Ur Neisseria gonorrhoeae,PCR  NEGATIVE    Negative  7









 Basic Metabolic Panel  2019  [facility]  Glucose  85 mg/dL  Normal    8



     [address]          



     [place] [address] [phone]          









 BUN  8 mg/dL  Normal  7-18  

 

 Creatinine  0.6 mg/dL  Normal  0.6-1.3  

 

 Glom Filtration Rate, Estimate  >60 mL/min    >60  

 

 If African American  >60 mL/min    >60  9

 

 BUN/Creat  13.3 ratio      

 

 Sodium  140 mmol/L  Normal  136-145  

 

 Potassium  3.6 mmol/L  Normal  3.5-5.1  

 

 Chloride  110 mmol/L  High    

 

 Carbon Dioxide  25 mmol/L  Normal  21-32  

 

 Anion Gap  5 mEq/L  Low  8-16  

 

 Calcium  8.5 mg/dL  Normal  8.5-10.1  









 Laboratory  2019  [facility]  D-Dimer,  0.32 ug/mL      10



 test finding    [address]  Quantitative        



     [place] [address] [phone]          

 

 HPV High Risk  2019  [facility]  HPV High Risk  Results on      



 - Alt Ref Lab    [address]    file      



     [place] [address] [phone]          

 

 Urine Dipstick  2019  [facility]  Ua Color  yellow    Yellow  









 Ua Clarity  clear    Clear  

 

 Ua Leuko  negative    Negative  

 

 Ua Nitrite  negative    Negative  

 

 Ua Urobilinogen  3.5 umol/L  High  0.2 - 1.0 E.U./dL  

 

 Ua Protein  0.15 g/L  High  Negative  

 

 Ua PH  8.5  High  6.5-7.5  

 

 Ua Blood  negative    Negative  

 

 Ua Specific Gravity  1.015    1.010-1.030  

 

 Ua Ketones  negative    Negative  

 

 Ua Bilirubin  negative    Negative  

 

 Ua Glucose  negative    Negative  









 Chlam/GC/Trichomonas  2019  [facility] [address]  Ur Trichomonas  NEGATIVE  
  Negative  



 PCR, Ur    [address]  vaginalis,PCR        



     [place] [address] [phone]          









 Ur Chlamydia trachomatis,PCR  NEGATIVE    Negative  

 

 Ur Neisseria gonorrhoeae,PCR  NEGATIVE    Negative  13









 Affirm  2019  [facility] [address]  Trichomonas  Negative    [Negative]  



 Vaginitis    [address]  vaginalis        



 [place] [address] [phone]          









 Gardnerella vaginalis  POSITIVE  Abnormal  [Negative]  

 

 Candida species  Negative    [Negative]  14









 Pregnancy Test, Serum  2019  N2N/CCD Import  Preg,Serum  Neg    Negative
  

 

 Ruq panel (ED only)  2019  N2N/CCD Import  Amylase  29 U/L    28 - 100  









 Lipase  15 U/L    13 - 60  

 

 Total Protein  6.4 g/dL    6.3 - 7.7  

 

 Albumin  4.2 g/dL    3.5 - 5.2  

 

 Bilirubin,Total  0.5 mg/dL    0.0 - 1.2  

 

 Bilirubin,Direct  <0.2    0.0 - 0.3 mg/dL  

 

 Alk Phos  68 U/L    35 - 105  

 

 Ast  24 U/L    0 - 35  

 

 Alt  22 U/L    0 - 35  









 Plasma profile 7  2019  N2N/CCD Import  Chloride,Plasma  108 mmol/L    
96 - 108  



 (Adult ED only)              









 Co2,Plasma  22 mmol/L    20 - 28  

 

 Potassium,Plasma  3.5 mmol/L    3.4 - 4.7  

 

 Sodium,Plasma  142 mmol/L    133 - 145  

 

 Anion Gap,PL  12    7 - 16  

 

 Un,Plasma  14 mg/dL    6 - 20  

 

 Creatinine  0.49 mg/dL  Low  0.51 - 0.95  

 

 GFR,  136    *  

 

 GFR,Black  157    *  

 

 Glucose,Plasma  150 mg/dL  High  60 - 99  









 CBC and differential  2019  N2N/CCD Import  WBC  6.1    4.0 - 10.0 Thou/
uL  









 RBC  4.5    3.9 - 5.2 Mil/uL  

 

 Hemoglobin  13.0 g/dL    11.2 - 15.7  

 

 Hematocrit  40 %    34 - 45  

 

 MCV  89 fL    79 - 95  

 

 MCH  29    26 - 32 pg/cell  

 

 MCHC  33 g/dL    32 - 36  

 

 RDW  12.5 %    11.7 - 14.4  

 

 Platelets  207    160 - 370 Thou/uL  

 

 Seg Neut %  91.1 %      

 

 Lymphocyte %  4.8 %      

 

 Monocyte %  3.1 %      

 

 Eosinophil %  0.0 %      

 

 Basophil %  0.2 %      

 

 Neut # K/uL  5.5    1.6 - 6.1 Thou/uL  

 

 Lymph # K/uL  0.3  Low  1.2 - 3.7 Thou/uL  

 

 Mono # K/uL  0.2    0.2 - 0.9 Thou/uL  

 

 Eos # K/uL  0.0    0.0 - 0.4 Thou/uL  

 

 Baso # K/uL  0.0    0.0 - 0.1 Thou/uL  

 

 Nucl RBC %  0.0    0.0 - 0.2 /100 WBC  

 

 Nucl RBC # K/uL  0.0    0.0 - 0.0 Thou/uL  

 

 Imm Granulocytes #  0.1    Thou/uL  

 

 Imm Granulocytes  0.8 %      









 1  [id number]

 

 2  SEE RESULT BELOW



   -----------------------------------------------------------------------------
---------------



   Name:  ALEJANDRINADONNA ADAMARIS                 : 1995    Attend Dr: Jami Valencia MD



   Acct:  [id number]  Unit: [id number]  AGE: 24            Location:  [facility]



   Re19                        SEX: F             Status:    DEP ER



   -----------------------------------------------------------------------------
---------------



   



   SPEC: [id number]         SARI:       [facility] DR: Jami Valencia MD



   REQ:  [id number]              RECD:   



   STATUS: FARIDEH HYDE DR: Taylor DUVALL



   _



   SOURCE: URINE          SPDESC:



   ORDERED:  Urine Culture



   COMMENTS: [id number]



   



   -----------------------------------------------------------------------------
---------------



   Procedure                         Result                         Reported   
        Site



   -----------------------------------------------------------------------------
---------------



   Urine Culture  Final                                             [id number]
[id number]      ML



   



   Few Enterobacteriacae; possible contamination.



   



   -----------------------------------------------------------------------------
---------------



   * ML - Main Lab



   .



   



   



   



   



   



   



   



   



   



   



   



   



   



   



   



   



   



   



   



   



   



   



   



   



   



   ** END OF REPORT **



   



   DEPARTMENT OF PATHOLOGY,  [address] [place] [address]



   Phone # [phone]      Fax #[phone]



   Arthur Sánchez M.D. Director     [facility] # [id number]

 

 3  : [id number]



   Test Disclaimer: Positive bacteria, red blood cells, white



   blood cells, early pregnancy, low specific gravity, and



   other factors may cause false positive or negative results.



   It is recommended to retest unexpected and borderline



   pregnancy results with a serum test when applicable.



   If pregnancy is still suspected, please repeat test after



   48 to 72 hours.

 

 4  : [id number]

 

 5  PELVIC PAIN, POSITIVE PREGNANCY TEST

 

 6  URINE, CLEAN CATCH

 

 7  A negative result for either  C. trachomatis and/or



   N. gonorrhoeae does not preclued an infection because



   results are dependent on adequate specimen collection,



   absence of inhibitors, and sufficient DNA to be detected.

 

 8  CP LT SIDE,ARM AND BACK PAIN

 

 9  Note:



   Persistent reduction for 3 months or more in an eGFR <60



   mL/min/1.73 m2 defines CKD.  Patients with eGFR values >/=60



   mL/min/1.73 m2 may also have CKD if evidence of persistent



   proteinuria is present.



   



   The original MDRD equation for estimated GFR is not valid



   for patients less than 18 years of age.



   



   Additional information may be found at www.kdoqi.org.

 

 10  <=0.49 ug/mL - Low likelihood of DIC, DVT or Pulmonary Embolism



   >0.49 ug/mL - Additional testing should be done to rule out



   DIC, DVT, or Pulmonary embolism as clinically



   indicated.



   



   ([facility] has established a 97.89% negative



   predictive value for thrombotic disease when a cutoff value of



   0.5 ug/mL is used.)

 

 11  Z11.3 Z00.01

 

 12  Hard copy of report to be sent by mail



   



   Report may be viewed in Clinical Review,



   or in PCI under Medical Record Forms

 

 13  A negative result for either  C. trachomatis and/or



   N. gonorrhoeae does not preclued an infection because



   results are dependent on adequate specimen collection,



   absence of inhibitors, and sufficient DNA to be detected.

 

 14  Method: BD Affirm VPIII  DNA Probe Assay







Procedures







 Description

 

 No Information Available







[employer]







 Description

 

 No Information Available







Encounters







 Type  Date  Location  Provider  Dx  Diagnosis

 

 Office Visit  2019  [facility] Taylor Mobley PA  F41.0  Panic 
disorder



   1:45p  [address]      [episodic



           paroxysmal anxiety]









 K52.9  Noninfective gastroenteritis and colitis, unspecified

 

 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









 Office Visit  2019 11:15a  [facility] Taylor Mobley,  Z00.01  
Encounter for



     [address]  PA    general adult



           medical exam w



           abnormal



           findings









 H66.011  Acute suppr otitis media w spon rupt ear drum, right ear

 

 H91.92  Unspecified hearing loss, left ear

 

 Z11.3  Encntr screen for infections w sexl mode of transmiss

 

 G43.101  Migraine with aura, not intractable, with status migrainosus

 

 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

 

 Z12.4  Encounter for screening for malignant neoplasm of cervix









 Office Visit  2019  4:30p  [facility]  Zulma Muller,  H66.011  
Acute suppr



     [address]  FNP    otitis media w



           spon rupt ear



           drum, right



           ear









 H91.92  Unspecified hearing loss, left ear

 

 F41.0  Panic disorder [episodic paroxysmal anxiety]

 

 B37.3  Candidiasis of vulva and vagina

 

 R51  Headache

 

 Z71.9  Counseling, unspecified









 Office Visit  2019 11:30a  [facility] Taylor Mobley,  H66.011  
Acute suppr



     [address]  PA    otitis media w



           spon rupt ear



           drum, right



           ear









 H91.92  Unspecified hearing loss, left ear









 Office Visit  04/10/2019  4:15p  Taylor Pichardo,  R11.2  
Nausea with



     [address]  PA    vomiting,



           unspecified









 F41.0  Panic disorder [episodic paroxysmal anxiety]









 Office Visit  2019 11:00a  Taylor Pichardo  F41.0  Panic 
disorder



     [address]  PA    [episodic



           paroxysmal



           anxiety]









 F31.9  Bipolar disorder, unspecified







Assessments







 Date  Code  Description  Provider

 

 2019  F41.0  Panic disorder [episodic paroxysmal anxiety]  Taylor Perry PA

 

 2019  K52.9  Noninfective gastroenteritis and colitis,  Taylor Perry PA



     unspecified  

 

 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

 

 2019  F41.0  Panic disorder [episodic paroxysmal anxiety]  Taylor Perry PA

 

 2019  K52.9  Noninfective gastroenteritis and colitis,  Taylor Perry PA



     unspecified  

 

 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

 

 2019  Z00.01  Encounter for general adult medical examination  Taylor Perry PA



     with abnorma  

 

 2019  H66.011  Acute suppurative otitis media with spontaneous  Taylor Perry PA



     rupture of e  

 

 2019  H91.92  Unspecified hearing loss, left ear  Taylor Perry PA

 

 2019  Z11.3  Encounter for screening for infections with a  Taylor Perry PA



     predominantly  

 

 2019  G43.101  Migraine with aura, not intractable, with status  Taylor Perry PA



     migrainosus  

 

 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

 

 2019  Z12.4  Encounter for screening for malignant neoplasm  Taylor Perry PA



     of cervix  

 

 2019  H66.011  Acute suppurative otitis media with spontaneous  Zulma Muller FNP



     rupture of e  

 

 2019  H91.92  Unspecified hearing loss, left ear  Zulma Muller FNP

 

 2019  F41.0  Panic disorder [episodic paroxysmal anxiety]  Zulma Muller FNP

 

 2019  B37.3  Candidiasis of vulva and vagina  Zulma Muller FNP

 

 2019  R51  Headache  Zulma Muller FNP

 

 2019  Z71.9  Counseling, unspecified  Zulma Muller FNP

 

 2019  H66.011  Acute suppurative otitis media with spontaneous  Taylor Perry PA



     rupture of e  

 

 2019  H91.92  Unspecified hearing loss, left ear  Taylor Perry PA

 

 04/10/2019  R11.2  Nausea with vomiting, unspecified  Taylor Perry PA

 

 04/10/2019  F41.0  Panic disorder [episodic paroxysmal anxiety]  Taylor Perry PA

 

 2019  F41.0  Panic disorder [episodic paroxysmal anxiety]  Taylor Perry PA

 

 2019  F31.9  Bipolar disorder, unspecified  Taylor Perry PA







Plan of Treatment

2019 - Taylor Perry, PAF41.0 Panic disorder [episodic paroxysmal anxiety]
Comments:Meds refilled. Please call [facility] to see if you can resume the intake 
process.K52.9 Noninfective gastroenteritis and colitis, unspecifiedComments:
Appt with Dr. Sanchez missed. Please call to reschedule.D48.62 Neoplasm of 
uncertain behavior of left breastComments:No cells present in Breast Biopsy. 
Will repeat sono of left breast after CBE in 3 months.



Functional Status







 Description

 

 No Information Available







Mental Status







 Description

 

 No Information Available







Referrals







 Refer to   Reason for Referral  Status  Appt Date

 

 Sadi Sanchez MD  Urgent referral to resume treatment. Patient  Scheduled  
2019



   notes hx of colitis and symptoms have flared    



   up terrifically lately.    









 [address] [facility]

 

 [address]

 

 [place] [address] [phone]

 

 









 Cryer, Jonathan ENT  25yo female patient wants to get re-established  Closed  
2019



   with an ENT for possible sinus surgery that she    



   was told she was a candidate for in the past.    



   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    



   ruptured right TM.    









 [address] [place] [address] [phone]

 

 









 Ramon Barriga MD  Previous patient. Continues with Migraines. monday  Sent  
09/10/2019



   Tuesday  Wednesday are good for pt    









 [address]

 

 [place]

 

 [address] [phone]

## 2019-09-30 NOTE — UC
Abdominal Pain Female HPI





- HPI Summary


HPI Summary: 


24-year-old female presents with 3 day history of right upper quadrant and 

flank pain as well as pelvic pressure.  Symptoms are associated with some mild 

nausea and fatigue.  Patient reports she has a history of colitis. States she 

was seen by GI at [facility] and told that they believe 

she may have either Crohn's or ulcerative colitis however she had an adverse 

reaction to the anesthesia during her upper endoscopy and they were unable to 

complete the study for a definitive diagnosis.  Patient also reports history of 

kidney stones.  Last BM was yesterday.  Reports loose brown stool.  She is 

actively trying to get pregnant.  Last menstrual period 2 months ago but she 

has been irregular.  Denies fever, chills, sore throat, cough, chest pain, 

shortness of breath, vomiting, blood in stool, melena, dysuria, frequency, 

urgency, hematuria, vaginal discharge, or dyspareunia.








- History of Current Complaint


Chief Complaint: UCAbdominalPain


Stated Complaint: PELVIC/RIB PAIN


Time Seen by Provider: 09/30/19 18:46


Hx Obtained From: Patient


Hx Last Menstrual Period: mid august, 2019


Pain Intensity: 4


Allergies/Adverse Reactions: 


 Allergies











Allergy/AdvReac Type Severity Reaction Status Date / Time


 


Adhesive Tape Allergy Intermediate SKIN SWELLS Verified 09/30/19 17:59


 


azithromycin Allergy  Vomiting Verified 09/30/19 17:59


 


latex Allergy  Swelling Verified 09/30/19 17:59


 


shellfish derived Allergy  Wheezing Verified 09/30/19 17:59











Home Medications: 


 Home Medications





Albuterol HFA INHALER* [Ventolin HFA Inhaler*] 1 - 2 puff INH Q4H PRN 09/30/19 [

History Confirmed 09/30/19]











PMH/Surg Hx/FS Hx/Imm Hx


GI/ History: Kidney Stones, Other - Colitis


Psychological History: Anxiety - Panic disorder





- Surgical History


Surgical History: Yes


Surgery Procedure, Year, and Place: 15 GI bx r/t c.diff





- Family History


Known Family History: Positive: Non-Contributory





- Social History


Occupation: Employed Full-time


Lives: With Family


Alcohol Use: None


Substance Use Type: Marijuana


Smoking Status (MU): Heavy Every Day Tobacco Smoker


Type: Cigarettes


Amount Used/How Often: 1/2 PPD


Length of Time of Smoking/Using Tobacco: age 12


Have You Smoked in the Last Year: Yes


Household Exposure Type: Cigarettes





- Immunization History


Most Recent Influenza Vaccination: none


Most Recent Tetanus Shot: AGE 20





Review of Systems


All Other Systems Reviewed And Are Negative: Yes


Constitutional: Negative: Fever, Chills


Skin: Negative: Rash


Respiratory: Negative: Shortness Of Breath, Cough


Cardiovascular: Negative: Palpitations, Chest Pain


Gastrointestinal: Positive: Abdominal Pain, Nausea.  Negative: Vomiting, 

Diarrhea


Genitourinary: Positive: Other - Pelvic pressure.  Negative: Dysuria, Hematuria

, Frequency, Urgency, Vaginal/Penile Discharge, Abnormal Bleeding


Musculoskeletal: Positive: Negative


Neurological: Positive: Negative


Is Patient Immunocompromised?: No





Physical Exam





- Summary


Physical Exam Summary: 


GENERAL APPEARANCE: Well developed, well nourished, alert and cooperative, and 

appears to be in no acute distress.





EYES: Conjunctiva clear. No drainage.





EARS: External auditory canals and tympanic membranes clear, hearing grossly 

intact.





NOSE: No nasal discharge.





THROAT: Pharynx normal. No tonsilar inflammation, swelling, exudate, or 

lesions. Uvula midline.





NECK: Neck supple, non-tender without lymphadenopathy.





CARDIAC: Normal S1 and S2. No S3, S4 or murmurs. Rhythm is regular. There is no 

peripheral edema, cyanosis or pallor. Extremities are warm and well perfused. 

Capillary refill is less than 2 seconds. Peripheral pulses intact.





LUNGS: Clear to auscultation without rales, rhonchi, wheezing or diminished 

breath sounds.





ABDOMEN: Positive bowel sounds. Soft, nondistended. RUQ and RLQ tenderness 

without guarding or rebound. No masses or hepatosplenomegally. No CVA 

tenderness.





MUSKULOSKELETAL: ROM intact to all extremities. No joint erythema or 

tenderness. Normal muscular development. Normal gait.





SKIN: Skin normal color, texture and turgor with no lesions or eruptions.





Triage Information Reviewed: Yes


Vital Signs: 


 Initial Vital Signs











Temp  98.3 F   09/30/19 18:00


 


Pulse  82   09/30/19 18:00


 


Resp  15   09/30/19 18:00


 


BP  106/63   09/30/19 18:00


 


Pulse Ox  98   09/30/19 18:00











Vital Signs Reviewed: Yes





Abd Pain Female Course/Dx





- Course


Course Of Treatment: 


24-year-old female presents with 3 day history of right upper quadrant and 

flank pain as well as pelvic pressure.  Symptoms are associated with some mild 

nausea and fatigue.  Patient reports she has a history of colitis. States she 

was seen by GI at [facility] and told that they believe 

she may have either Crohn's or ulcerative colitis however she had an adverse 

reaction to the anesthesia during her upper endoscopy and they were unable to 

complete the study for a definitive diagnosis.  Patient also reports history of 

kidney stones.  Last BM was yesterday.  Reports loose brown stool.  She is 

actively trying to get pregnant.  Last menstrual period 2 months ago but she 

has been irregular.  Denies fever, chills, sore throat, cough, chest pain, 

shortness of breath, vomiting, blood in stool, melena, dysuria, frequency, 

urgency, hematuria, vaginal discharge, or dyspareunia.  Afebrile.  Vital signs 

stable.  Patient had a soft, nondistended abdomen with normal bowel sounds, 

tenderness to the right upper and right lower quadrants without guarding or 

rebound, no CVA tenderness, and otherwise unremarkable exam.  Point-of-care 

urinalysis was normal.  Urine pregnancy was negative.  Discussed with the 

patient that based on her past history persistent pain and I'm recommending 

that she be evaluated in the emergency room at this time.  Patient is agreeable 

to this and is electing to transport via private vehicle.








- Differential Dx/Diagnosis


Differential Diagnosis: Appendicitis, Constipation, Irritable Bowel Syndrome, 

Pelvic Inflammatory Disease, Pregnancy, Renal Colic, Urinary Tract Infection, 

Other - Colitis


Provider Diagnosis: 


 Acute abdominal pain in right upper quadrant, Acute pain in female pelvis








Discharge ED





- Sign-Out/Discharge


Documenting (check all that apply): Patient Departure


All imaging exams completed and their final reports reviewed: No Studies





- Discharge Plan


Condition: Stable


Disposition: HOME


Referrals: 


Taylor Perry PA [Primary Care Provider] - 


Additional Instructions: 


The urine test performed in the clinic tonight was normal and the pregnancy 

test was negative.





With your history of colitis I would recommend that you be evaluated in the 

emergency room at this time. Do not eat or drink anything until you have been 

evaluated. Please go directly to the emergency room at this time.





- Billing Disposition and Condition


Condition: STABLE


Disposition: Home

## 2019-09-30 NOTE — XMS REPORT
Continuity of Care Document (CCD)

 Created on:2019



Patient:Donna Munoz

Sex:Female

:1995

External Reference #:[id number]





Demographics







 Address  [address] [place] [address]

 

 Home Phone  [phone]

 

 Mobile Phone  [phone]

 

 Preferred Language  en

 

 Marital Status  Not  or 

 

 Faith Affiliation  Unknown

 

 Race  White

 

 Ethnic Group  Not  or 









Author







 Name  Taylor Perry PA

 

 Address  [address] [place] [address]









Care Team Providers







 Name  Role  Phone

 

 Taylor Perry PA - Medical  Care Team Information   [phone]









Problems







 Active Problems  Provider  Date

 

 Allergic rhinitis  Taylor Perry PA  Onset: 2018

 

 Exercise-induced asthma  Taylor Perry PA  Onset: 2018

 

 Tobacco user  Taylor Perry PA  Onset: 2018

 

 Anxiety state  Taylor Perry PA  Onset: 2018

 

 Bipolar disorder  Taylor Perry PA  Onset: 2018

 

 Abdominal pain  Sadi Sanchez MD  Onset: 2018

 

 Gastrointestinal tract finding  Sadi Sanchez MD  Onset: 2018

 

 Nausea and vomiting  Sadi Sanchez MD  Onset: 2018







Social History







 Type  Date  Description  Comments

 

 Birth Sex    Unknown  

 

 Tobacco Use  Start: Unknown  currently smokes 1/2  



     Pack Daily  

 

 Smoking Status  Reviewed: 19  currently smokes 1/2  



     Pack Daily  

 

 Smokeless Tobacco    Never Used Smokeless  



     Tobacco  

 

 ETOH Use    Has consumed alcohol in  sober since 2017



     the past  

 

 Recreational Drug Use    Formerly used  



     Barbiturates  



     sporadically  

 

 Tobacco Use  Start: Unknown  Patient is a current  1/2 ppd pt also



     smoker, smokes every day  smokes marijuana

 

 Recreational Drug Use    Marijuana  

 

 Enjoy Exercising    Does not enjoy  



     exercising  

 

 Tattoo/Piercing    Pierced ears  

 

 Tattoo/Piercing    Pierced Nasal Area  

 

 Tattoo/Piercing    Pierced Navel  







Allergies, Adverse Reactions, Alerts







 Active Allergies  Reaction  Severity  Comments  Date

 

 Estrogens      facial numbness/migraine aura  2011

 

 Progesterone      facial numbness, migraine aura  2011







Medications







 Active Medications  SIG  Qnty  Indications  Ordering  Date



         Provider  

 

 Hyoscyamine Sulfate  take 1 tablet by  30tabs  K52.9  Nisha Coffman,  2019



   mouth every 4      MD  



 0.125mg Tablets  hours as needed        



   for abnormal        



   function of the        



   digestive system        

 

 Clonazepam  1 tab by mouth  15tabs    Nisha Coffman,  2019



          0.5mg  three times a day      MD  



 Tablets  as needed for        



   panic . 5 day        



   emergency supply        



   Ref #        

 

 Quetiapine Fumarate  take 1 tablet by  30tabs  F31.9  Nisha Coffman,  2019



   mouth at bedtime      MD  



 100mg Tablets          



           

 

 Guanfacine HCL  1/2 by mouth twice  30tabs  F31.9  Nisha Coffman,  2019



              1mg  a day      MD  



 Tablets          



           

 

 Citalopram  1 by mouth every  30tabs  F31.9  Nisha Coffman,  2019



 Hydrobromide  day      MD  



            20mg          



 Tablets          



           

 

 Ventolin HFA  2 puffs every 4  18gm  J45.990  Teri Van,  2018



   hours as needed      M.D.  



 108(90Base) mcg/Act  for cough and        



 Aerosol  wheeze        



           









 History Medications









 Methylprednisolone  take tablets as  21units  K52.9  Augustus,  2019 -



                4mg TBPK  directed-dose pack      MD Nisha  2019



           

 

 Magnesium  1 by mouth every  90tabs  G43.101  [facility]  2019 -



       400mg Tablets  day for the      MD Nisha  2019



   prevention of        



   headache        

 

 Vitamin B-2  1 by mouth every  90tabs  G43.101  [facility]  2019 -



         100mg Tablets  day for prevention      MD Nisha  2019



   of headache        

 

 Diflucan  take 1 tablet by  2tabs    Zulma Muller,  2019 -



      150mg Tablets  mouth one time.      FNP  2019



   may repeat in 1        



   week if necessary.        

 

 Clonazepam  1 tab by mouth  75tabs    [facility]  2019 -



        0.5mg Tablets  every morning, 1      MD Nisha  2019



 Dispers  1/2 every evenning        



   Reference        



   #[id number]        







Immunizations







 Description

 

 No Information Available







Vital Signs







 Date  Vital  Result  Comment

 

 2019  3:44pm  BP Systolic  118 mmHg  









 BP Diastolic  70 mmHg  

 

 Body Temperature  99.3 F  

 

 Heart Rate  90 /min  

 

 Respiratory Rate  18 /min  

 

 Weight  134.12 lb  

 

 O2 % BldC Oximetry  97 %  









 2019  2:37pm  BP Systolic  118 mmHg  









 BP Diastolic  64 mmHg  

 

 Body Temperature  99.2 F  

 

 Heart Rate  96 /min  

 

 Respiratory Rate  17 /min  

 

 Height  65.5 inches  5'5.50"

 

 Weight  135.44 lb  

 

 BMI (Body Mass Index)  22.2 kg/m2  

 

 BSA (Body Surface Area)  1.69 m2  

 

 Ideal body weight in kilograms  58 kg  

 

 O2 % BldC Oximetry  98 %  







Results







 Test  Date  Facility  Test  Result  H/L  Range  Note

 

 Urine Culture And    [facility]  Urine  SEE 
RESULT      1, 2



 Sensitivities  4 [phone]  Culture  BELOW      

 

 Laboratory test    [facility]  Poc  Negative    
Negative  3



 finding  1 [phone]  Pregnancy,        



       Urine        

 

 Poc Urinalysis    [facility]  Poc Glucose,  
Negative    Negative  



   2 [phone]  Urine        









 Poc Bilirubin, Urine  1+  Abnormal  Negative  

 

 Poc Ketone, Urine  Trace  Abnormal  Negative  

 

 Poc Specific Gravity, Urine  1.025  Normal  1.010-1.030  

 

 Poc Blood, Urine  3+  Abnormal  Negative  

 

 Poc pH, Urine  6.5  Normal  5-9  

 

 Poc Protein, Urine  2+  Abnormal  Negative  

 

 Poc Urobilinogen, Urine  1.0    Negative  

 

 Poc Nitrite, Urine  Negative    Negative  

 

 Poc Leukocytes, Urine  1+  Abnormal  Negative  

 

 Poc Color, Urine  Yellow      

 

 Poc Clarity, Urine  Clear      4









 CBC W/Automated  2019  [facility]  White Blood  7.7 K/uL  Normal  3.1-10.7  5



 Diff    [address]  Count        



     [place] [address]          



     [phone]          









 Red Blood Count  4.68 M/uL  Normal  3.90-5.40  

 

 Hemoglobin  14.1 gm/dL  Normal  11.6-15.8  

 

 Hematocrit  42.1 %  Normal  36.0-46.1  

 

 Mean Cell Volume  90.0 fl  Normal  80.9-99.0  

 

 Mean Corpuscular HGB  30.1 pg  Normal  25.9-32.7  

 

 Mean Corpuscular HGB Conc  33.5 g/dL  Normal  30.8-34.3  

 

 Platelet Count  267 K/uL  Normal  155-360  

 

 Red Cell Distri Width SD  42.5 fl  Normal  36-47  

 

 Red Cell Distri Width %CV  12.9 %  Normal  11.7-14.4  

 

 Mean Platelet Volume  10.0 fl  Normal  8.9-12.4  

 

 Neut%  62.0 %  Normal  40.4-72.8  

 

 Lymph %  27.0 %  Normal  20.0-42.0  

 

 Mono %  8.0 %  Normal  4.3-13.2  

 

 Eo%  2.1 %  Normal  0.0-6.6  

 

 Bas%  0.4 %  Normal  0.0-1.1  

 

 Immature Grans  0.5 %  Normal  0.0-5.0  

 

 NRBC %  0.0 /100WBC    < 10/ 100 WBC  

 

 Neut#  4.78 K/uL  Normal  1.8-7.0  

 

 Lymph #  2.08 K/uL  Normal  1.0-4.0  

 

 Mono #  0.62 K/uL  Normal  0.3-0.9  

 

 Eos #  0.16 K/uL  Normal  0.0-0.5  

 

 Baso #  0.03 K/uL  Normal  0.0-0.1  

 

 Immature Grans Absolute  0.04 K/uL      

 

 NRBC #  0.00 K/uL      









 Ua RFX Micro & Culture  2019  [facility]  Urine Color  YELLOW    Yellow  



 II    [address] [place] [address] [phone]          









 Urine Clarity  CLEAR    Clear  

 

 Urine Glucose - Dipstick  NEGATIVE mg/dL    Negative  

 

 Urine Bilirubin - Dipstick  NEGATIVE    Negative  

 

 Urine Ketone  NEGATIVE mg/dL    Negative  

 

 Urine Specific Gravity  1.020  Normal  1.010-1.030  

 

 Urine Blood  NEGATIVE    Negative  

 

 Urine PH  7.5  Normal  6.5-7.5  

 

 Urine Protein - Dipstick  NEGATIVE mg/dL    Negative  

 

 Urine Urobilinogen - Dipstick  0.2 E.U./dL  Normal  0.2-1.0  

 

 Urine Nitrite - Dipstick  NEGATIVE    Negative  

 

 Urine Leuk Esterase  NEGATIVE    Negative  

 

 Source:  URINE, CLEAN CAT <SEE NOTE>      6









 Chlam/GC/Trichomonas  2019  [facility]  Ur Trichomonas  NEGATIVE    Negative  



 PCR, Ur    [address]  vaginalis,PCR        



     [place], [place] [id number] [phone]          









 Ur Chlamydia trachomatis,PCR  NEGATIVE    Negative  

 

 Ur Neisseria gonorrhoeae,PCR  NEGATIVE    Negative  7









 Basic Metabolic Panel  2019  [facility]  Glucose  85 mg/dL  Normal    8



     [address]          



     [place] [address] [phone]          









 BUN  8 mg/dL  Normal  7-18  

 

 Creatinine  0.6 mg/dL  Normal  0.6-1.3  

 

 Glom Filtration Rate, Estimate  >60 mL/min    >60  

 

 If African American  >60 mL/min    >60  9

 

 BUN/Creat  13.3 ratio      

 

 Sodium  140 mmol/L  Normal  136-145  

 

 Potassium  3.6 mmol/L  Normal  3.5-5.1  

 

 Chloride  110 mmol/L  High    

 

 Carbon Dioxide  25 mmol/L  Normal  21-32  

 

 Anion Gap  5 mEq/L  Low  8-16  

 

 Calcium  8.5 mg/dL  Normal  8.5-10.1  









 Laboratory  2019  [facility]  D-Dimer,  0.32 ug/mL      10



 test finding    [address]  Quantitative        



     [place] [address] [phone]          

 

 HPV High Risk  2019  [facility]  HPV High Risk  Results on      



 - Alt Ref Lab    [address]    file      



     [place] [address] [phone]          

 

 Urine Dipstick  2019  [facility]  Ua Color  yellow    Yellow  









 Ua Clarity  clear    Clear  

 

 Ua Leuko  negative    Negative  

 

 Ua Nitrite  negative    Negative  

 

 Ua Urobilinogen  3.5 umol/L  High  0.2 - 1.0 E.U./dL  

 

 Ua Protein  0.15 g/L  High  Negative  

 

 Ua PH  8.5  High  6.5-7.5  

 

 Ua Blood  negative    Negative  

 

 Ua Specific Gravity  1.015    1.010-1.030  

 

 Ua Ketones  negative    Negative  

 

 Ua Bilirubin  negative    Negative  

 

 Ua Glucose  negative    Negative  









 Chlam/GC/Trichomonas  2019  [facility] [address]  Ur Trichomonas  NEGATIVE  
  Negative  



 PCR, Ur    [address]  vaginalis,PCR        



     [place] [address] [phone]          









 Ur Chlamydia trachomatis,PCR  NEGATIVE    Negative  

 

 Ur Neisseria gonorrhoeae,PCR  NEGATIVE    Negative  13









 Affirm  2019  [facility] [address]  Trichomonas  Negative    [Negative]  



 Vaginitis    [address]  vaginalis        



 [place] [address] [phone]          









 Gardnerella vaginalis  POSITIVE  Abnormal  [Negative]  

 

 Candida species  Negative    [Negative]  14









 Pregnancy Test, Serum  2019  N2N/CCD Import  Preg,Serum  Neg    Negative
  

 

 Ruq panel (ED only)  2019  N2N/CCD Import  Amylase  29 U/L    28 - 100  









 Lipase  15 U/L    13 - 60  

 

 Total Protein  6.4 g/dL    6.3 - 7.7  

 

 Albumin  4.2 g/dL    3.5 - 5.2  

 

 Bilirubin,Total  0.5 mg/dL    0.0 - 1.2  

 

 Bilirubin,Direct  <0.2    0.0 - 0.3 mg/dL  

 

 Alk Phos  68 U/L    35 - 105  

 

 Ast  24 U/L    0 - 35  

 

 Alt  22 U/L    0 - 35  









 Plasma profile 7  2019  N2N/CCD Import  Chloride,Plasma  108 mmol/L    
96 - 108  



 (Adult ED only)              









 Co2,Plasma  22 mmol/L    20 - 28  

 

 Potassium,Plasma  3.5 mmol/L    3.4 - 4.7  

 

 Sodium,Plasma  142 mmol/L    133 - 145  

 

 Anion Gap,PL  12    7 - 16  

 

 Un,Plasma  14 mg/dL    6 - 20  

 

 Creatinine  0.49 mg/dL  Low  0.51 - 0.95  

 

 GFR,  136    *  

 

 GFR,Black  157    *  

 

 Glucose,Plasma  150 mg/dL  High  60 - 99  









 CBC and differential  2019  N2N/CCD Import  WBC  6.1    4.0 - 10.0 Thou/
uL  









 RBC  4.5    3.9 - 5.2 Mil/uL  

 

 Hemoglobin  13.0 g/dL    11.2 - 15.7  

 

 Hematocrit  40 %    34 - 45  

 

 MCV  89 fL    79 - 95  

 

 MCH  29    26 - 32 pg/cell  

 

 MCHC  33 g/dL    32 - 36  

 

 RDW  12.5 %    11.7 - 14.4  

 

 Platelets  207    160 - 370 Thou/uL  

 

 Seg Neut %  91.1 %      

 

 Lymphocyte %  4.8 %      

 

 Monocyte %  3.1 %      

 

 Eosinophil %  0.0 %      

 

 Basophil %  0.2 %      

 

 Neut # K/uL  5.5    1.6 - 6.1 Thou/uL  

 

 Lymph # K/uL  0.3  Low  1.2 - 3.7 Thou/uL  

 

 Mono # K/uL  0.2    0.2 - 0.9 Thou/uL  

 

 Eos # K/uL  0.0    0.0 - 0.4 Thou/uL  

 

 Baso # K/uL  0.0    0.0 - 0.1 Thou/uL  

 

 Nucl RBC %  0.0    0.0 - 0.2 /100 WBC  

 

 Nucl RBC # K/uL  0.0    0.0 - 0.0 Thou/uL  

 

 Imm Granulocytes #  0.1    Thou/uL  

 

 Imm Granulocytes  0.8 %      









 1  [id number]

 

 2  SEE RESULT BELOW



   -----------------------------------------------------------------------------
---------------



   Name:  DONNA MUNOZ                 : 1995    Attend Dr: Jami Valencia MD



   Acct:  [id number]  Unit: [id number]  AGE: 24            Location:  [facility]



   Re19                        SEX: F             Status:    DEP ER



   -----------------------------------------------------------------------------
---------------



   



   SPEC: [id number]         SARI:       [facility] DR: Jami Valencia MD



   REQ:  [id number]              RECD:   



   STATUS: FARIDEH HYDE DR: Taylor Perry PA



   _



   SOURCE: URINE          SPDESC:



   ORDERED:  Urine Culture



   COMMENTS: [id number]



   



   -----------------------------------------------------------------------------
---------------



   Procedure                         Result                         Reported   
        Site



   -----------------------------------------------------------------------------
---------------



   Urine Culture  Final                                             [id number]
[id number]      ML



   



   Few Enterobacteriacae; possible contamination.



   



   -----------------------------------------------------------------------------
---------------



   * ML - Main Lab



   .



   



   



   



   



   



   



   



   



   



   



   



   



   



   



   



   



   



   



   



   



   



   



   



   



   



   ** END OF REPORT **



   



   DEPARTMENT OF PATHOLOGY,  [address] [place] [address]



   Phone # [phone]      Fax #[phone]



   Arthur Sánchez M.D. Director     [facility] # [id number]

 

 3  : [id number]



   Test Disclaimer: Positive bacteria, red blood cells, white



   blood cells, early pregnancy, low specific gravity, and



   other factors may cause false positive or negative results.



   It is recommended to retest unexpected and borderline



   pregnancy results with a serum test when applicable.



   If pregnancy is still suspected, please repeat test after



   48 to 72 hours.

 

 4  : [id number]

 

 5  PELVIC PAIN, POSITIVE PREGNANCY TEST

 

 6  URINE, CLEAN CATCH

 

 7  A negative result for either  C. trachomatis and/or



   N. gonorrhoeae does not preclued an infection because



   results are dependent on adequate specimen collection,



   absence of inhibitors, and sufficient DNA to be detected.

 

 8  CP LT SIDE,ARM AND BACK PAIN

 

 9  Note:



   Persistent reduction for 3 months or more in an eGFR <60



   mL/min/1.73 m2 defines CKD.  Patients with eGFR values >/=60



   mL/min/1.73 m2 may also have CKD if evidence of persistent



   proteinuria is present.



   



   The original MDRD equation for estimated GFR is not valid



   for patients less than 18 years of age.



   



   Additional information may be found at www.kdoqi.org.

 

 10  <=0.49 ug/mL - Low likelihood of DIC, DVT or Pulmonary Embolism



   >0.49 ug/mL - Additional testing should be done to rule out



   DIC, DVT, or Pulmonary embolism as clinically



   indicated.



   



   ([facility] has established a 97.89% negative



   predictive value for thrombotic disease when a cutoff value of



   0.5 ug/mL is used.)

 

 11  Z11.3 Z00.01

 

 12  Hard copy of report to be sent by mail



   



   Report may be viewed in Clinical Review,



   or in PCI under Medical Record Forms

 

 13  A negative result for either  C. trachomatis and/or



   N. gonorrhoeae does not preclued an infection because



   results are dependent on adequate specimen collection,



   absence of inhibitors, and sufficient DNA to be detected.

 

 14  Method: BD Affirm VPIII  DNA Probe Assay







Procedures







 Description

 

 No Information Available







[employer]







 Description

 

 No Information Available







Encounters







 Type  Date  Location  Provider  Dx  Diagnosis

 

 Office Visit  2019  [facility] Taylor Mobley PA  F41.0  Panic 
disorder



   2:30p  [address]      [episodic



           paroxysmal anxiety]









 K52.9  Noninfective gastroenteritis and colitis, unspecified

 

 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









 Office Visit  2019  1:45p  Taylor Pichardo,  F41.0  Panic 
disorder



     [address]  PA    [episodic



           paroxysmal



           anxiety]









 K52.9  Noninfective gastroenteritis and colitis, unspecified

 

 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









 Office Visit  2019 11:15a  [facility] Taylor Mobley,  Z00.01  
Encounter for



     [address]  PA    general adult



           medical exam w



           abnormal



           findings









 H66.011  Acute suppr otitis media w spon rupt ear drum, right ear

 

 H91.92  Unspecified hearing loss, left ear

 

 Z11.3  Encntr screen for infections w sexl mode of transmiss

 

 G43.101  Migraine with aura, not intractable, with status migrainosus

 

 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

 

 Z12.4  Encounter for screening for malignant neoplasm of cervix









 Office Visit  2019  4:30p  [facility]  Zulma Muller,  H66.011  
Acute suppr



     [address]  FNP    otitis media w



           spon rupt ear



           drum, right



           ear









 H91.92  Unspecified hearing loss, left ear

 

 F41.0  Panic disorder [episodic paroxysmal anxiety]

 

 B37.3  Candidiasis of vulva and vagina

 

 R51  Headache

 

 Z71.9  Counseling, unspecified









 Office Visit  2019 11:30a  [facility] Taylor Mobley,  H66.011  
Acute suppr



     [address]  PA    otitis media w



           spon rupt ear



           drum, right



           ear









 H91.92  Unspecified hearing loss, left ear









 Office Visit  04/10/2019  4:15p  [facility] Taylor Mobley,  R11.2  
Nausea with



     [address]  PA    vomiting,



           unspecified









 F41.0  Panic disorder [episodic paroxysmal anxiety]









 Office Visit  2019 11:00a  [facility] Taylor Mobley,  F41.0  Panic 
disorder



     [address]  PA    [episodic



           paroxysmal



           anxiety]









 F31.9  Bipolar disorder, unspecified







Assessments







 Date  Code  Description  Provider

 

 2019  F41.0  Panic disorder [episodic paroxysmal anxiety]  Taylor Perry PA

 

 2019  K52.9  Noninfective gastroenteritis and colitis,  Taylor Perry PA



     unspecified  

 

 2019  F41.9  Anxiety disorder, unspecified  Taylor Perry PA

 

 2019  F41.0  Panic disorder [episodic paroxysmal anxiety]  Taylor Perry PA

 

 2019  K52.9  Noninfective gastroenteritis and colitis,  Taylor Perry PA



     unspecified  

 

 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

 

 2019  F41.0  Panic disorder [episodic paroxysmal anxiety]  Taylor Perry PA

 

 2019  K52.9  Noninfective gastroenteritis and colitis,  Taylor Perry PA



     unspecified  

 

 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

 

 2019  Z00.01  Encounter for general adult medical examination  Taylor Perry PA



     with abnorma  

 

 2019  H66.011  Acute suppurative otitis media with spontaneous  Taylor Perry PA



     rupture of e  

 

 2019  H91.92  Unspecified hearing loss, left ear  Taylor Perry PA

 

 2019  Z11.3  Encounter for screening for infections with a  Taylor Perry PA



     predominantly  

 

 2019  G43.101  Migraine with aura, not intractable, with status  Taylor Perry PA



     migrainosus  

 

 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

 

 2019  Z12.4  Encounter for screening for malignant neoplasm  Taylor Perry PA



     of cervix  

 

 2019  H66.011  Acute suppurative otitis media with spontaneous  Zulma Muller FNP



     rupture of e  

 

 2019  H91.92  Unspecified hearing loss, left ear  Zulma Muller FNP

 

 2019  F41.0  Panic disorder [episodic paroxysmal anxiety]  Zulma Muller FNP

 

 2019  B37.3  Candidiasis of vulva and vagina  Zulma Muller FNP

 

 2019  R51  Headache  uZlma Muller [facility]

 

 2019  Z71.9  Counseling, unspecified  Zulma Muller [facility]

 

 2019  H66.011  Acute suppurative otitis media with spontaneous  Taylor Perry PA



     rupture of e  

 

 2019  H91.92  Unspecified hearing loss, left ear  Taylor Perry PA

 

 04/10/2019  R11.2  Nausea with vomiting, unspecified  Taylor Perry PA

 

 04/10/2019  F41.0  Panic disorder [episodic paroxysmal anxiety]  Taylor Perry PA

 

 2019  F41.0  Panic disorder [episodic paroxysmal anxiety]  Taylor Perry PA

 

 2019  F31.9  Bipolar disorder, unspecified  Taylor Perry PA







Plan of Treatment

2019 - Taylor Perry PAF41.0 Panic disorder [episodic paroxysmal anxiety]
Comments:Refill of Clonazepam sent. PC to pharmacy to request early fill. 
Controlled substance contract signed. Urine tox screen collected.K52.9 
Noninfective gastroenteritis and colitis, unspecifiedComments:Provide abundant 
clear fluids.Gentry foods as tolerated. Avoid all dairy other than yogurt.Eat 
cheesewith cautionMay try acidophilus/probiotic, which is available in the 
vitamin section at the drug store.F41.9 Anxiety disorder, unspecifiedComments:
Counselling established at [facility] with Kerrie Gambino. The plan is [facility].



Functional Status







 Description

 

 No Information Available







Mental Status







 Description

 

 No Information Available







Referrals







 Refer to   Reason for Referral  Status  Appt Date

 

 Sadi Sanchez MD  Urgent referral to resume treatment.  Patient Declined  



   Patient notes hx of colitis and    



   symptoms have flared up terrifically    



   lately.    









 [address] [facility]

 

 [address]

 

 [place] [address] [phone]

 

 









 Cryer, Jonathan ENT  23yo female patient wants to get re-established  Closed  
2019



   with an ENT for possible sinus surgery that she    



   was told she was a candidate for in the past.    



   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    



   ruptured right TM.    









 [address] [place] [address] [phone]

 

 









 Ramon Barriga MD  Previous patient. Continues with  Patient Declined  09/10/
2019



   Migraines.     



   are good for pt    









 [address]

 

 [place]

 

 [address] [phone]

## 2019-09-30 NOTE — XMS REPORT
Continuity of Care Document (CCD)

 Created on:2019



Patient:Donna Munoz

Sex:Female

:1995

External Reference #:[id number]





Demographics







 Address  [address] [place] [address]

 

 Home Phone  [phone]

 

 Mobile Phone  [phone]

 

 Preferred Language  en

 

 Marital Status  Not  or 

 

 Latter-day Affiliation  Unknown

 

 Race  White

 

 Ethnic Group  Not  or 









Author







 Name  Taylor Perry PA

 

 Address  [address] [place] [address]









Care Team Providers







 Name  Role  Phone

 

 Taylor Perry PA - Medical  Care Team Information   [phone]









Problems







 Active Problems  Provider  Date

 

 Allergic rhinitis  Taylor Perry PA  Onset: 2018

 

 Exercise-induced asthma  Taylor Perry PA  Onset: 2018

 

 Tobacco user  Taylor Perry PA  Onset: 2018

 

 Anxiety state  Taylor Perry PA  Onset: 2018

 

 Bipolar disorder  Taylor Perry PA  Onset: 2018

 

 Abdominal pain  Sadi Sanchez MD  Onset: 2018

 

 Gastrointestinal tract finding  Sadi Sanchez MD  Onset: 2018

 

 Nausea and vomiting  Sadi Sanchez MD  Onset: 2018







Social History







 Type  Date  Description  Comments

 

 Birth Sex    Unknown  

 

 Tobacco Use  Start: Unknown  currently smokes 1/2 Pack  



     Daily  

 

 Smoking Status  Reviewed: 19  currently smokes 1/2 Pack  



     Daily  

 

 Smokeless Tobacco    Never Used Smokeless  



     Tobacco  

 

 ETOH Use    Has consumed alcohol in  sober since 2017



     the past  

 

 Recreational Drug Use    Formerly used Barbiturates  



     sporadically  

 

 Tobacco Use  Start: Unknown  Patient is a current  1/2 ppd



     smoker, smokes every day  

 

 Recreational Drug Use    Marijuana  

 

 Enjoy Exercising    Does not enjoy exercising  

 

 Tattoo/Piercing    Pierced ears  

 

 Tattoo/Piercing    Pierced Nasal Area  

 

 Tattoo/Piercing    Pierced Navel  







Allergies, Adverse Reactions, Alerts







 Active Allergies  Reaction  Severity  Comments  Date

 

 Estrogens      facial numbness/migraine aura  2011

 

 Progesterone      facial numbness, migraine aura  2011







Medications







 Active Medications  SIG  Qnty  Indications  Ordering  Date



         Provider  

 

 Methylprednisolone  take tablets as  21units  K52.9  Augustus,  2019



              4mg TBPK  directed-dose      MD Nisha  



   pack        

 

 Hyoscyamine Sulfate  take 1 tablet by  30tabs  K52.9  Augustus,  2019



               0.125mg  mouth every 4      MD Nisha  



 Tablets  hours as needed        



   for abnormal        



   function of the        



   digestive system        

 

 Clonazepam  1 tab by mouth  75tabs    Augustus,  2019



      0.5mg Tablets  three times a      MD Nisha  



   day as needed        



   for panic .        



   Temporary sig        



   change Ref        



   #[id number]        

 

 Quetiapine Fumarate  1 tab by mouth  30tabs  F31.9  Augustus,  2019



               100mg  at bedtime daily      MD Nisha  



 Tablets          

 

 Guanfacine HCL  1/2 by mouth  30tabs  F31.9  Augustus,  2019



          1mg Tablets  twice a day      MD Nisha  



           

 

 Citalopram Hydrobromide  1 by mouth every  30tabs  F31.9  Augustus,  2019



                   20mg  day      MD Nisha  



 Tablets          

 

 Ventolin HFA  2 puffs every 4  18gm  J45.990  Teri Van,  2018



        108(90Base)  hours as needed      M.D.  



 mcg/Act Aerosol  for cough and        



   wheeze        









 History Medications









 Magnesium  1 by mouth every  90tabs  G43.101  Nisha Coffman,  2019 -



            400mg  day for the      MD  2019



 Tablets  prevention of        



   headache        

 

 Vitamin B-2  1 by mouth every  90tabs  G43.101  Nisha Coffman,  2019 -



              100mg  day for prevention      MD  2019



 Tablets  of headache        



           

 

 Diflucan  take 1 tablet by  2tabs    Zulma Muller,  2019 -



           150mg  mouth one time.      FNP  2019



 Tablets  may repeat in 1        



   week if necessary.        

 

 Clonazepam  1 tab by mouth  75tabs    Nisha Coffman,  2019 -



             0.5mg  every morning, 1      MD  2019



 Tablets Dispers  1/2 every evenning        



   Reference        



   #[id number]        







Immunizations







 Description

 

 No Information Available







Vital Signs







 Date  Vital  Result  Comment

 

 2019  1:50pm  BP Systolic  98 mmHg  









 BP Diastolic  60 mmHg  

 

 Body Temperature  98.5 F  

 

 Heart Rate  92 /min  

 

 Respiratory Rate  18 /min  

 

 Height  63 inches  5'3"

 

 Weight  137.50 lb  

 

 BMI (Body Mass Index)  24.4 kg/m2  

 

 BSA (Body Surface Area)  1.65 m2  

 

 Ideal body weight in kilograms  52 kg  

 

 O2 % BldC Oximetry  97 %  









 2019 11:36am  BP Systolic Sitting Right Arm  108 mmHg  









 BP Diastolic Sitting Right Arm  60 mmHg  

 

 Body Temperature  99.5 F  

 

 Heart Rate  107 /min  

 

 Height  63 inches  5'3"

 

 Weight  140.25 lb  

 

 BMI (Body Mass Index)  24.8 kg/m2  

 

 BSA (Body Surface Area)  1.66 m2  

 

 Ideal body weight in kilograms  52 kg  

 

 O2 % BldC Oximetry  95 %  







Results







 Test  Date  Facility  Test  Result  H/L  Range  Note

 

 Urine Culture And    [facility]  Urine  SEE 
RESULT      1, 2



 Sensitivities  3 [phone]  Culture  BELOW      

 

 Laboratory test    [facility]  Poc  Negative    
Negative  3



 finding  6 [phone]  Pregnancy,        



       Urine        

 

 Poc Urinalysis    [facility]  Poc Glucose,  
Negative    Negative  



   9 [phone]  Urine        









 Poc Bilirubin, Urine  1+  Abnormal  Negative  

 

 Poc Ketone, Urine  Trace  Abnormal  Negative  

 

 Poc Specific Gravity, Urine  1.025  Normal  1.010-1.030  

 

 Poc Blood, Urine  3+  Abnormal  Negative  

 

 Poc pH, Urine  6.5  Normal  5-9  

 

 Poc Protein, Urine  2+  Abnormal  Negative  

 

 Poc Urobilinogen, Urine  1.0    Negative  

 

 Poc Nitrite, Urine  Negative    Negative  

 

 Poc Leukocytes, Urine  1+  Abnormal  Negative  

 

 Poc Color, Urine  Yellow      

 

 Poc Clarity, Urine  Clear      4









 CBC W/Automated  2019  [facility]  White Blood  7.7 K/uL  Normal  3.1-10.7  5



 Diff    [address]  Count        



     [place] [address]          



     [phone]          









 Red Blood Count  4.68 M/uL  Normal  3.90-5.40  

 

 Hemoglobin  14.1 gm/dL  Normal  11.6-15.8  

 

 Hematocrit  42.1 %  Normal  36.0-46.1  

 

 Mean Cell Volume  90.0 fl  Normal  80.9-99.0  

 

 Mean Corpuscular HGB  30.1 pg  Normal  25.9-32.7  

 

 Mean Corpuscular HGB Conc  33.5 g/dL  Normal  30.8-34.3  

 

 Platelet Count  267 K/uL  Normal  155-360  

 

 Red Cell Distri Width SD  42.5 fl  Normal  36-47  

 

 Red Cell Distri Width %CV  12.9 %  Normal  11.7-14.4  

 

 Mean Platelet Volume  10.0 fl  Normal  8.9-12.4  

 

 Neut%  62.0 %  Normal  40.4-72.8  

 

 Lymph %  27.0 %  Normal  20.0-42.0  

 

 Mono %  8.0 %  Normal  4.3-13.2  

 

 Eo%  2.1 %  Normal  0.0-6.6  

 

 Bas%  0.4 %  Normal  0.0-1.1  

 

 Immature Grans  0.5 %  Normal  0.0-5.0  

 

 NRBC %  0.0 /100WBC    < 10/ 100 WBC  

 

 Neut#  4.78 K/uL  Normal  1.8-7.0  

 

 Lymph #  2.08 K/uL  Normal  1.0-4.0  

 

 Mono #  0.62 K/uL  Normal  0.3-0.9  

 

 Eos #  0.16 K/uL  Normal  0.0-0.5  

 

 Baso #  0.03 K/uL  Normal  0.0-0.1  

 

 Immature Grans Absolute  0.04 K/uL      

 

 NRBC #  0.00 K/uL      









 Ua RFX Micro & Culture  2019  [facility]  Urine Color  YELLOW    Yellow  



 II    [address]          



     [place] [address] [phone]          









 Urine Clarity  CLEAR    Clear  

 

 Urine Glucose - Dipstick  NEGATIVE mg/dL    Negative  

 

 Urine Bilirubin - Dipstick  NEGATIVE    Negative  

 

 Urine Ketone  NEGATIVE mg/dL    Negative  

 

 Urine Specific Gravity  1.020  Normal  1.010-1.030  

 

 Urine Blood  NEGATIVE    Negative  

 

 Urine PH  7.5  Normal  6.5-7.5  

 

 Urine Protein - Dipstick  NEGATIVE mg/dL    Negative  

 

 Urine Urobilinogen - Dipstick  0.2 E.U./dL  Normal  0.2-1.0  

 

 Urine Nitrite - Dipstick  NEGATIVE    Negative  

 

 Urine Leuk Esterase  NEGATIVE    Negative  

 

 Source:  URINE, CLEAN CAT <SEE NOTE>      6









 Chlam/GC/Trichomonas  2019  [facility]  Ur Trichomonas  NEGATIVE    Negative  



 PCR, Ur    [address]  vaginalis,PCR        



     [place] [address] [phone]          









 Ur Chlamydia trachomatis,PCR  NEGATIVE    Negative  

 

 Ur Neisseria gonorrhoeae,PCR  NEGATIVE    Negative  7









 Basic Metabolic Panel  2019  [facility]  Glucose  85 mg/dL  Normal    8



     [address]          



     [place] [address] [phone]          









 BUN  8 mg/dL  Normal  7-18  

 

 Creatinine  0.6 mg/dL  Normal  0.6-1.3  

 

 Glom Filtration Rate, Estimate  >60 mL/min    >60  

 

 If African American  >60 mL/min    >60  9

 

 BUN/Creat  13.3 ratio      

 

 Sodium  140 mmol/L  Normal  136-145  

 

 Potassium  3.6 mmol/L  Normal  3.5-5.1  

 

 Chloride  110 mmol/L  High    

 

 Carbon Dioxide  25 mmol/L  Normal  21-32  

 

 Anion Gap  5 mEq/L  Low  8-16  

 

 Calcium  8.5 mg/dL  Normal  8.5-10.1  









 Laboratory test  2019  [facility]  D-Dimer,  0.32 ug/mL      10



 finding    [address]  Quantitative        



     [place] [address] [phone]          

 

 Chlam/GC/Tricho  2019  [facility] [address]  Ur Trichomonas  NEGATIVE    
Negative  11



 monas PCR, Ur    [address]  vaginalis,PCR        



     [place] [address] [phone]          









 Ur Chlamydia trachomatis,PCR  NEGATIVE    Negative  

 

 Ur Neisseria gonorrhoeae,PCR  NEGATIVE    Negative  12









 Urine Dipstick  2019  [facility]  Ua Color  yellow    Yellow  









 Ua Clarity  clear    Clear  

 

 Ua Leuko  negative    Negative  

 

 Ua Nitrite  negative    Negative  

 

 Ua Urobilinogen  3.5 umol/L  High  0.2 - 1.0 E.U./dL  

 

 Ua Protein  0.15 g/L  High  Negative  

 

 Ua PH  8.5  High  6.5-7.5  

 

 Ua Blood  negative    Negative  

 

 Ua Specific Gravity  1.015    1.010-1.030  

 

 Ua Ketones  negative    Negative  

 

 Ua Bilirubin  negative    Negative  

 

 Ua Glucose  negative    Negative  









 HPV High Risk -  2019  [facility]  HPV High Risk  Results on      13



 Alt Ref Lab    [address]    file      



     [place] [address] [phone]          

 

 Affirm  2019  [facility] [address]  Trichomonas  Negative    [Nega  



 Vaginitis Panel    [address]  vaginalis      tive]  



     [place] [address] [phone]          









 Gardnerella vaginalis  POSITIVE  Abnormal  [Negative]  

 

 Candida species  Negative    [Negative]  14









 Pregnancy Test, Serum  2019  N2N/CCD Import  Preg,Serum  Neg    Negative
  

 

 Ruq panel (ED only)  2019  N2N/CCD Import  Amylase  29 U/L    28 - 100  









 Lipase  15 U/L    13 - 60  

 

 Total Protein  6.4 g/dL    6.3 - 7.7  

 

 Albumin  4.2 g/dL    3.5 - 5.2  

 

 Bilirubin,Total  0.5 mg/dL    0.0 - 1.2  

 

 Bilirubin,Direct  <0.2    0.0 - 0.3 mg/dL  

 

 Alk Phos  68 U/L    35 - 105  

 

 Ast  24 U/L    0 - 35  

 

 Alt  22 U/L    0 - 35  









 Plasma profile 7  2019  N2N/CCD Import  Chloride,Plasma  108 mmol/L    
96 - 108  



 (Adult ED only)              









 Co2,Plasma  22 mmol/L    20 - 28  

 

 Potassium,Plasma  3.5 mmol/L    3.4 - 4.7  

 

 Sodium,Plasma  142 mmol/L    133 - 145  

 

 Anion Gap,PL  12    7 - 16  

 

 Un,Plasma  14 mg/dL    6 - 20  

 

 Creatinine  0.49 mg/dL  Low  0.51 - 0.95  

 

 GFR,  136    *  

 

 GFR,Black  157    *  

 

 Glucose,Plasma  150 mg/dL  High  60 - 99  









 CBC and differential  2019  N2N/CCD Import  WBC  6.1    4.0 - 10.0 Thou/
uL  









 RBC  4.5    3.9 - 5.2 Mil/uL  

 

 Hemoglobin  13.0 g/dL    11.2 - 15.7  

 

 Hematocrit  40 %    34 - 45  

 

 MCV  89 fL    79 - 95  

 

 MCH  29    26 - 32 pg/cell  

 

 MCHC  33 g/dL    32 - 36  

 

 RDW  12.5 %    11.7 - 14.4  

 

 Platelets  207    160 - 370 Thou/uL  

 

 Seg Neut %  91.1 %      

 

 Lymphocyte %  4.8 %      

 

 Monocyte %  3.1 %      

 

 Eosinophil %  0.0 %      

 

 Basophil %  0.2 %      

 

 Neut # K/uL  5.5    1.6 - 6.1 Thou/uL  

 

 Lymph # K/uL  0.3  Low  1.2 - 3.7 Thou/uL  

 

 Mono # K/uL  0.2    0.2 - 0.9 Thou/uL  

 

 Eos # K/uL  0.0    0.0 - 0.4 Thou/uL  

 

 Baso # K/uL  0.0    0.0 - 0.1 Thou/uL  

 

 Nucl RBC %  0.0    0.0 - 0.2 /100 WBC  

 

 Nucl RBC # K/uL  0.0    0.0 - 0.0 Thou/uL  

 

 Imm Granulocytes #  0.1    Thou/uL  

 

 Imm Granulocytes  0.8 %      









 1  [id number]

 

 2  SEE RESULT BELOW



   -----------------------------------------------------------------------------
---------------



   Name:  DONNA MUNOZ ADAMARIS                 : 1995    Attend Dr: Jami Valencia MD



   Acct:  [id number]  Unit: [id number]  AGE: 24            Location:  [facility]



   Re19                        SEX: F             Status:    DEP ER



   -----------------------------------------------------------------------------
---------------



   



   SPEC: [id number]         SARI:   19-    SUBM DR: Jami Valencia MD



   REQ:  [id number]              RECD:   



   STATUS: FARIDEH HYDE DR: Taylor DUVALL



   _



   SOURCE: URINE          SPDESC:



   ORDERED:  Urine Culture



   COMMENTS: [id number]



   



   -----------------------------------------------------------------------------
---------------



   Procedure                         Result                         Reported   
        Site



   -----------------------------------------------------------------------------
---------------



   Urine Culture  Final                                             19-
[id number]      ML



   



   Few Enterobacteriacae; possible contamination.



   



   -----------------------------------------------------------------------------
---------------



   * ML - Main Lab



   .



   



   



   



   



   



   



   



   



   



   



   



   



   



   



   



   



   



   



   



   



   



   



   



   



   



   ** END OF REPORT **



   



   DEPARTMENT OF PATHOLOGY,  [address] [place] [address]



   Phone # [phone]      Fax #[phone]



   Arthur Sánchez M.D. Director     [facility] # [id number]

 

 3  : [id number]



   Test Disclaimer: Positive bacteria, red blood cells, white



   blood cells, early pregnancy, low specific gravity, and



   other factors may cause false positive or negative results.



   It is recommended to retest unexpected and borderline



   pregnancy results with a serum test when applicable.



   If pregnancy is still suspected, please repeat test after



   48 to 72 hours.

 

 4  : [id number]

 

 5  PELVIC PAIN, POSITIVE PREGNANCY TEST

 

 6  URINE, CLEAN CATCH

 

 7  A negative result for either  C. trachomatis and/or



   N. gonorrhoeae does not preclued an infection because



   results are dependent on adequate specimen collection,



   absence of inhibitors, and sufficient DNA to be detected.

 

 8  CP LT SIDE,ARM AND BACK PAIN

 

 9  Note:



   Persistent reduction for 3 months or more in an eGFR <60



   mL/min/1.73 m2 defines CKD.  Patients with eGFR values >/=60



   mL/min/1.73 m2 may also have CKD if evidence of persistent



   proteinuria is present.



   



   The original MDRD equation for estimated GFR is not valid



   for patients less than 18 years of age.



   



   Additional information may be found at www.kdoqi.org.

 

 10  <=0.49 ug/mL - Low likelihood of DIC, DVT or Pulmonary Embolism



   >0.49 ug/mL - Additional testing should be done to rule out



   DIC, DVT, or Pulmonary embolism as clinically



   indicated.



   



   ([facility] has established a 97.89% negative



   predictive value for thrombotic disease when a cutoff value of



   0.5 ug/mL is used.)

 

 11  Z11.3 Z00.01

 

 12  A negative result for either  C. trachomatis and/or



   N. gonorrhoeae does not preclued an infection because



   results are dependent on adequate specimen collection,



   absence of inhibitors, and sufficient DNA to be detected.

 

 13  Hard copy of report to be sent by mail



   



   Report may be viewed in Clinical Review,



   or in PCI under Medical Record Forms

 

 14  Method: BD Affirm VPIII  DNA Probe Assay







Procedures







 Description

 

 No Information Available







Medical Devices







 Description

 

 No Information Available







Encounters







 Type  Date  Location  Provider  Dx  Diagnosis

 

 Office Visit  2019  [facility]  Tayolr Perry PA  Z00.01  
Encounter for



   11:15a  [address]      general adult



           medical exam w



           abnormal findings









 H66.011  Acute suppr otitis media w spon rupt ear drum, right ear

 

 H91.92  Unspecified hearing loss, left ear

 

 Z11.3  Encntr screen for infections w sexl mode of transmiss

 

 G43.101  Migraine with aura, not intractable, with status migrainosus

 

 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

 

 Z12.4  Encounter for screening for malignant neoplasm of cervix









 Office Visit  2019  4:30p  [facility]  Zulma Muller,  H66.011  
Acute suppr



     [address]  FNP    otitis media w



           spon rupt ear



           drum, right



           ear









 H91.92  Unspecified hearing loss, left ear

 

 F41.0  Panic disorder [episodic paroxysmal anxiety]

 

 B37.3  Candidiasis of vulva and vagina

 

 R51  Headache

 

 Z71.9  Counseling, unspecified









 Office Visit  2019 11:30a  [facility]  Taylor Perry,  H66.011  
Acute suppr



     [address]  PA    otitis media w



           spon rupt ear



           drum, right



           ear









 H91.92  Unspecified hearing loss, left ear









 Office Visit  04/10/2019  4:15p  [facility]  Taylor Perry,  R11.2  
Nausea with



     [address]  PA    vomiting,



           unspecified









 F41.0  Panic disorder [episodic paroxysmal anxiety]









 Office Visit  2019 11:00a  [facility]  Taylor Perry,  F41.0  Panic 
disorder



     [address]  PA    [episodic



           paroxysmal



           anxiety]









 F31.9  Bipolar disorder, unspecified







Assessments







 Date  Code  Description  Provider

 

 2019  F41.0  Panic disorder [episodic paroxysmal anxiety]  Taylor Perry PA

 

 2019  K52.9  Noninfective gastroenteritis and colitis,  Taylor Perry PA



     unspecified  

 

 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

 

 2019  Z00.01  Encounter for general adult medical examination  Taylor Perry PA



     with abnorma  

 

 2019  H66.011  Acute suppurative otitis media with spontaneous  Taylor Perry PA



     rupture of e  

 

 2019  H91.92  Unspecified hearing loss, left ear  Taylor Perry PA

 

 2019  Z11.3  Encounter for screening for infections with a  Taylor Perry PA



     predominantly  

 

 2019  G43.101  Migraine with aura, not intractable, with status  Taylor Prery PA



     migrainosus  

 

 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

 

 2019  Z12.4  Encounter for screening for malignant neoplasm  Taylor Perry PA



     of cervix  

 

 2019  H66.011  Acute suppurative otitis media with spontaneous  Zulma Muller FNP



     rupture of e  

 

 2019  H91.92  Unspecified hearing loss, left ear  Zulma Muller FNP

 

 2019  F41.0  Panic disorder [episodic paroxysmal anxiety]  Zulma Muller FNP

 

 2019  B37.3  Candidiasis of vulva and vagina  Zulma Muller FNP

 

 2019  R51  Headache  Zulma Muller FNP

 

 2019  Z71.9  Counseling, unspecified  Zulma Muller FNP

 

 2019  H66.011  Acute suppurative otitis media with spontaneous  Taylor Perry PA



     rupture of e  

 

 2019  H91.92  Unspecified hearing loss, left ear  Taylor Perry PA

 

 04/10/2019  R11.2  Nausea with vomiting, unspecified  Taylor Perry PA

 

 04/10/2019  F41.0  Panic disorder [episodic paroxysmal anxiety]  Taylor Perry PA

 

 2019  F41.0  Panic disorder [episodic paroxysmal anxiety]  Taylor Perry PA

 

 2019  F31.9  Bipolar disorder, unspecified  Taylor Perry PA







Plan of Treatment

Future Appointment(s):2019  1:15 pm - Taylor Perry PA at [facility]2019 - [place], Taylor, PAF41.0 Panic disorder [episodic 
paroxysmal anxiety]Comments:Abd pain has increased the anxiety. Will increase 
the Clonazepam to 1 tab tid. Patient is waiting for psych consult at [facility]. 
Expects to be seen in the next month.Follow up:2 itsmrR73.9 Noninfective 
gastroenteritis and colitis, unspecifiedNew Medication:Methylprednisolone 4 mg 
- take tablets as directed-dose packHyoscyamine Sulfate 0.125 mg - take 1 
tablet by mouth every 4 hours as needed for abnormal function of the digestive 
systemComments:Clear fluids. Small meals or snacks with soft, bland 
diet.Referral:Sadi Sanchez MD, OnwsbjueqglgcxnoK39.62 Neoplasm of uncertain 
behavior of left breastNew Xrays:Ultrasound, Breast Unilateral Left, Ordered: 



Functional Status







 Description

 

 No Information Available







Mental Status







 Description

 

 No Information Available







Referrals







 Refer to   Reason for Referral  Status  Appt Date

 

 Sadi Sanchez MD  Urgent referral to resume treatment. Patient  Created  



   notes hx of colitis and symptoms have flared    



   up terrifically lately.    









 [address] [facility]

 

 [address]

 

 [place] [address] [phone]

 

 









 Cryer, Jonathan ENT  23yo female patient wants to get re-established  Closed  
2019



   with an ENT for possible sinus surgery that she    



   was told she was a candidate for in the past.    



   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    



   ruptured right TM.    









 [address] [place] [address] [phone]

 

 









 Ramon Barriga MD  Previous patient. Continues with Migraines. monday  Sent  
09/10/2019



   Tuesday  Wednesday are good for pt    









 [address]

 

 [place]

 

 [address] [phone]

## 2019-11-09 ENCOUNTER — HOSPITAL ENCOUNTER (EMERGENCY)
Dept: HOSPITAL 25 - UCCORT | Age: 24
Discharge: HOME | End: 2019-11-09
Payer: SELF-PAY

## 2019-11-09 VITALS — DIASTOLIC BLOOD PRESSURE: 76 MMHG | SYSTOLIC BLOOD PRESSURE: 111 MMHG

## 2019-11-09 DIAGNOSIS — Z91.040: ICD-10-CM

## 2019-11-09 DIAGNOSIS — R19.7: ICD-10-CM

## 2019-11-09 DIAGNOSIS — J06.9: Primary | ICD-10-CM

## 2019-11-09 DIAGNOSIS — Z91.013: ICD-10-CM

## 2019-11-09 DIAGNOSIS — Z88.8: ICD-10-CM

## 2019-11-09 DIAGNOSIS — Z88.1: ICD-10-CM

## 2019-11-09 DIAGNOSIS — Z79.899: ICD-10-CM

## 2019-11-09 DIAGNOSIS — F31.9: ICD-10-CM

## 2019-11-09 DIAGNOSIS — F90.9: ICD-10-CM

## 2019-11-09 DIAGNOSIS — F17.210: ICD-10-CM

## 2019-11-09 DIAGNOSIS — Z91.09: ICD-10-CM

## 2019-11-09 PROCEDURE — G0463 HOSPITAL OUTPT CLINIC VISIT: HCPCS

## 2019-11-09 PROCEDURE — 99212 OFFICE O/P EST SF 10 MIN: CPT

## 2019-11-09 NOTE — UC
Throat Pain/Nasal Jose Francisco HPI





- HPI Summary


HPI Summary: 


24-year-old female presents with one-week history of URI symptoms.  States 

started with a sore throat and then developed nasal congestion, sinus pressure, 

and a occasional a productive cough.  Has been taking DayQuil with relief in 

symptoms.  Reports history of recurrent ear infections.  States has some ear 

fullness and popping been no pain at present. Has had some loose stools. States 

family members are sick with similar symptoms. Denies fever, chills, dysphagia, 

chest pain, shortness of breath, abdominal pain, or vomiting.








- History of Current Complaint


Chief Complaint: UCRespiratory


Stated Complaint: SORE THROAT,COUGH


Time Seen by Provider: 11/09/19 14:02


Hx Obtained From: Patient


Hx Last Menstrual Period: 10/9/19


Pain Intensity: 0





- Allergies/Home Medications


Allergies/Adverse Reactions: 


 Allergies











Allergy/AdvReac Type Severity Reaction Status Date / Time


 


Adhesive Tape Allergy Intermediate SKIN SWELLS Verified 11/09/19 14:09


 


azithromycin Allergy  Vomiting Verified 11/09/19 14:09


 


haloperidol [From Haldol] Allergy  Itching Verified 11/09/19 14:09


 


latex Allergy  Swelling Verified 11/09/19 14:09


 


shellfish derived Allergy  Wheezing Verified 11/09/19 14:09











Home Medications: 


 Home Medications





Divalproex ER TAB(*) [Depakote ER TAB(*)] 500 mg PO DAILY 11/09/19 [History 

Confirmed 11/09/19]











PMH/Surg Hx/FS Hx/Imm Hx


Previously Healthy: Yes


Psychological History: Bipolar Disorder, Other - ADHD





- Surgical History


Surgical History: Yes


Surgery Procedure, Year, and Place: 15 GI bx r/t c.diff





- Family History


Known Family History: Positive: Non-Contributory





- Social History


Occupation: Employed Full-time


Lives: Alone


Alcohol Use: None


Substance Use Type: Marijuana


Smoking Status (MU): Heavy Every Day Tobacco Smoker


Type: Cigarettes


Amount Used/How Often: 1/2 PPD


Length of Time of Smoking/Using Tobacco: age 12


Have You Smoked in the Last Year: Yes


Household Exposure Type: Cigarettes





- Immunization History


Most Recent Influenza Vaccination: none


Most Recent Tetanus Shot: AGE 20





Review of Systems


All Other Systems Reviewed And Are Negative: Yes


Constitutional: Negative: Fever, Chills


Eyes: Negative: Drainage, Eye Redness


ENT: Positive: Sore Throat, Nasal Discharge, Sinus Congestion, Sinus Pain/

Tenderness.  Negative: Ear Ache


Respiratory: Positive: Cough.  Negative: Shortness Of Breath


Cardiovascular: Negative: Palpitations, Chest Pain


Gastrointestinal: Positive: Diarrhea.  Negative: Abdominal Pain, Vomiting, 

Nausea


Genitourinary: Positive: Negative


Musculoskeletal: Positive: Negative


Neurological: Positive: Negative


Is Patient Immunocompromised?: No





Physical Exam





- Summary


Physical Exam Summary: 


GENERAL APPEARANCE: Well developed, well nourished, alert and cooperative, and 

appears to be in no acute distress.





EYES: Conjunctiva clear. No drainage.





EARS: External auditory canals and tympanic membranes clear, hearing grossly 

intact.





NOSE: Mild-moderate nasal congestion. No nasal discharge.





THROAT: Pharyngeal erythema with post-nasal drip. No tonsilar inflammation, 

swelling, exudate, or lesions. Uvula midline.





NECK: Neck supple, non-tender without lymphadenopathy.





CARDIAC: Normal S1 and S2. No S3, S4 or murmurs. Rhythm is regular. There is no 

peripheral edema, cyanosis or pallor. Extremities are warm and well perfused. 

Capillary refill is less than 2 seconds. Peripheral pulses intact.





LUNGS: Clear to auscultation without rales, rhonchi, wheezing or diminished 

breath sounds. Non-productive cough.





ABDOMEN: Positive bowel sounds. Soft, nondistended, nontender. No guarding or 

rebound. No masses or hepatosplenomegally.





MUSKULOSKELETAL: ROM intact to all extremities. No joint erythema or 

tenderness. Normal muscular development. Normal gait.





SKIN: Skin normal color, texture and turgor with no lesions or eruptions.





Triage Information Reviewed: Yes


Vital Signs: 


 Initial Vital Signs











Temp  97.4 F   11/09/19 13:59


 


Pulse  94   11/09/19 13:59


 


Resp  18   11/09/19 13:59


 


BP  111/76   11/09/19 13:59


 


Pulse Ox  100   11/09/19 13:59











Vital Signs Reviewed: Yes





Throat Pain/Nasal Course/Dx





- Course


Course Of Treatment: 


24-year-old female presents with one-week history of URI symptoms.  States 

started with a sore throat and then developed nasal congestion, sinus pressure, 

and a occasional a productive cough.  Has been taking DayQuil with relief in 

symptoms.  Reports history of recurrent ear infections.  States has some ear 

fullness and popping been no pain at present. Has had some loose stools. States 

family members are sick with similar symptoms. Denies fever, chills, dysphagia, 

chest pain, shortness of breath, abdominal pain, or vomiting.  Afebrile.  Vital 

signs stable.  Patient had moderate nasal congestion, clear.  TMs, pharyngeal 

erythema with postnasal drip, no tonsillar swelling or exudate, no cervical 

lymphadenopathy, clear bilateral breath sounds, a nonproductive cough, and 

otherwise unremarkable exam.  Discussed with the patient that she likely has a 

viral upper respiratory infection and recommending symptomatically treatment at 

this time.  Patient expressed to me that her insurance lapsed and is currently 

pending and is concerned that if her symptoms persist that she will develop 

another ear infection based on her past experience.  I have offered to send in 

a prescription for amoxicillin 875 mg twice daily 10 days that she could fill 

if her symptoms persisted or worsened but strongly encouraged her to continue 

with symptomatic care at this time.  She is to follow-up with her primary care 

provider in 3-5 days if symptoms are not improving.  Anticipatory guidance and 

warning symptoms were reviewed with the patient.  Verbalizes understanding and 

agrees with plan of care.








- Differential Dx/Diagnosis


Differential Diagnosis/HQI/PQRI: Mononucleosis, Peritonsillar Abscess, 

Pharyngitis, Sinusitis, Tonsillitis, URI


Provider Diagnosis: 


 URI (upper respiratory infection)








Discharge ED





- Sign-Out/Discharge


Documenting (check all that apply): Patient Departure


All imaging exams completed and their final reports reviewed: No Studies





- Discharge Plan


Condition: Stable


Disposition: HOME


Prescriptions: 


Amoxicillin PO (*) [Amoxicillin 875 MG (*)] 875 mg PO BID #20 tab


Patient Education Materials:  Upper Respiratory Infection (ED)


Referrals: 


Abdulkadir Corado MD [Primary Care Provider] - 3 Days (If no improvement.)


Additional Instructions: 


As we discussed, your history and exam are consistent with a viral upper 

respiratory infection. Viral infections do not respond to antibiotics and are 

limited to the treatment of symptoms. Viral infections typically run their 

course in 7-10 days.  With your history of recurrent ear infections I will send 

in a prescription for an antibiotic that you can fill in a couple days of 

symptoms persist or worsen.





Take amoxicillin 875 mg twice daily for 10 days.  Take with food to avoid upset 

stomach.  Be sure to finish the entire course even if feeling better.





Drink plenty of fluids to avoid dehydration especially if you are running any 

fever.





Use an over the counter decongestant such as Sudafed according to directions to 

help with the nasal congestion.





Take over the counter acetaminophen (Tylenol) or ibuprofen (Advil, Motrin) 

according to directions as needed for pain or fever.





Use salt water gargles several times a day if you have a sore throat.





You may also use Chloraseptic spray or Cepacol lonzenges according to 

directions which contain a numbing medication and can provide some temporary 

relief from your sore throat.





Follow up with your primary care provider in 3-5 days if symptoms persist.





Seek immediate medical attention in the emergency room if you have fever 

greater than 100.5 F despite taking acetaminophen or ibuprofen, have chest pain

, difficulty breathing, are unable to swallow, or have any worsening of 

symptoms.





- Billing Disposition and Condition


Condition: STABLE


Disposition: Home





- Attestation Statements


Provider Attestation: 


I was available for consult. This patient was seen by the RONAL. The patient was 

not presented to , seen by or examined by me -Radha Lennon MD

## 2020-01-07 ENCOUNTER — HOSPITAL ENCOUNTER (EMERGENCY)
Dept: HOSPITAL 25 - UCCORT | Age: 25
Discharge: HOME | End: 2020-01-07
Payer: COMMERCIAL

## 2020-01-07 VITALS — DIASTOLIC BLOOD PRESSURE: 74 MMHG | SYSTOLIC BLOOD PRESSURE: 116 MMHG

## 2020-01-07 DIAGNOSIS — Z91.040: ICD-10-CM

## 2020-01-07 DIAGNOSIS — F17.210: ICD-10-CM

## 2020-01-07 DIAGNOSIS — K64.5: Primary | ICD-10-CM

## 2020-01-07 DIAGNOSIS — Z88.1: ICD-10-CM

## 2020-01-07 DIAGNOSIS — Z91.048: ICD-10-CM

## 2020-01-07 DIAGNOSIS — Z91.013: ICD-10-CM

## 2020-01-07 PROCEDURE — 84702 CHORIONIC GONADOTROPIN TEST: CPT

## 2020-01-07 PROCEDURE — 99212 OFFICE O/P EST SF 10 MIN: CPT

## 2020-01-07 PROCEDURE — G0463 HOSPITAL OUTPT CLINIC VISIT: HCPCS

## 2020-01-28 ENCOUNTER — HOSPITAL ENCOUNTER (EMERGENCY)
Dept: HOSPITAL 25 - UCCORT | Age: 25
Discharge: HOME | End: 2020-01-28
Payer: COMMERCIAL

## 2020-01-28 VITALS — DIASTOLIC BLOOD PRESSURE: 64 MMHG | SYSTOLIC BLOOD PRESSURE: 126 MMHG

## 2020-01-28 DIAGNOSIS — R11.2: ICD-10-CM

## 2020-01-28 DIAGNOSIS — Z88.8: ICD-10-CM

## 2020-01-28 DIAGNOSIS — Z91.09: ICD-10-CM

## 2020-01-28 DIAGNOSIS — R09.81: Primary | ICD-10-CM

## 2020-01-28 DIAGNOSIS — Z91.013: ICD-10-CM

## 2020-01-28 DIAGNOSIS — R05: ICD-10-CM

## 2020-01-28 DIAGNOSIS — R51: ICD-10-CM

## 2020-01-28 DIAGNOSIS — F17.210: ICD-10-CM

## 2020-01-28 DIAGNOSIS — Z88.1: ICD-10-CM

## 2020-01-28 DIAGNOSIS — M79.10: ICD-10-CM

## 2020-01-28 DIAGNOSIS — Z91.040: ICD-10-CM

## 2020-01-28 PROCEDURE — G0463 HOSPITAL OUTPT CLINIC VISIT: HCPCS

## 2020-01-28 PROCEDURE — 99211 OFF/OP EST MAY X REQ PHY/QHP: CPT

## 2020-01-28 NOTE — UC
FLU HPI





- HPI Summary


HPI Summary: 


23yo female presenting with body aches, chills, subjective fever, nasal 

congestion, and cough x3 days. Denies sore throat. Patient also notes 

intermittent nausea and vomiting. States 5-6 episodes of emesis over last 3 

days. Denies abdominal pain. Denies current nausea. Last episode before she 

came. Denies changes in BMs. Denies urinary symptoms. Denies sob and difficulty 

breathing. Notes decreased appetite but keeping down fluids well. Denies taking 

anything for symptom relief.








- History of Current Complaint


Chief Complaint: UCGeneralIllness


Stated Complaint: FLU LIKE SYMPTOMS


Hx Obtained From: Patient


Hx Last Menstrual Period: 1/14/20


Pain Intensity: 4


Pain Scale Used: 0-10 Numeric





- Allergy/Home Medications


Allergies/Adverse Reactions: 


 Allergies











Allergy/AdvReac Type Severity Reaction Status Date / Time


 


Adhesive Tape Allergy Intermediate SKIN SWELLS Verified 01/28/20 20:03


 


azithromycin Allergy  Vomiting Verified 01/28/20 20:03


 


haloperidol [From Haldol] Allergy  Itching Verified 01/28/20 20:03


 


latex Allergy  Swelling Verified 01/28/20 20:03


 


shellfish derived Allergy  Wheezing Verified 01/28/20 20:03











Home Medications: 


 Home Medications





Citalopram TAB* [CeleXA TAB*] 20 mg PO DAILY 01/28/20 [History Confirmed 01/28/ 20]


Ibuprofen TAB* [Motrin TAB* 800 MG] 800 mg PO ONCE 01/28/20 [History Confirmed 

01/28/20]











PMH/Surg Hx/FS Hx/Imm Hx





- Surgical History


Surgical History: Yes


Surgery Procedure, Year, and Place: 15 GI bx r/t c.diff





- Family History


Known Family History: Positive: Non-Contributory





- Social History


Alcohol Use: None


Substance Use Type: Marijuana


Substance Use Comment - Amount & Last Used: daily


Smoking Status (MU): Heavy Every Day Tobacco Smoker


Type: Cigarettes


Amount Used/How Often: 1/2 PPD


Length of Time of Smoking/Using Tobacco: age 12


Have You Smoked in the Last Year: Yes


Household Exposure Type: Cigarettes





- Immunization History


Most Recent Influenza Vaccination: none


Most Recent Tetanus Shot: AGE 20





Review of Systems


All Other Systems Reviewed And Are Negative: Yes


Constitutional: Positive: Fever, Chills


ENT: Positive: Sinus Congestion


Respiratory: Positive: Cough.  Negative: Shortness Of Breath


Cardiovascular: Positive: Negative


Gastrointestinal: Positive: Vomiting - x5, Nausea.  Negative: Abdominal Pain, 

Diarrhea


Genitourinary: Positive: Negative


Musculoskeletal: Positive: Myalgia


Neurological: Positive: Headache





Physical Exam





- Summary


Physical Exam Summary: 


Vital Signs Reviewed: Yes


A+Ox3, no distress


Eyes: Conjunctiva Clear


ENT: Hearing grossly normal, TM x 2 clear, moist, uvula midline, no exudate, no 

erythema


Neck: Positive: Supple


Respiratory: Positive: No respiratory distress, No accessory muscle use + CTA 

throughout  no w/r


Cardiovascular: RRR  nl s1, s2  no m/r 


Abd: soft + BS nt/nd  no guarding, no distension


Musculoskeletal Exam: JOHNSON x 4 without difficulty 


Neurological: Positive: Alert


Psychological: Positive: age appropriate behavior


Skin: Positive: no rash, no ecchymosis








Vital Signs: 


 Initial Vital Signs











Temp  99.1 F   01/28/20 20:05


 


Pulse  98   01/28/20 20:05


 


Resp  15   01/28/20 20:05


 


BP  126/64   01/28/20 20:05


 


Pulse Ox  99   01/28/20 20:05








 Lab Results











  01/28/20 Range/Units





  20:15 


 


Influenza A (Rapid)  Negative  (Negative)  


 


Influenza B (Rapid)  Negative  (Negative)  














Flu Course/Dx





- Course


Course Of Treatment: 


Negative rapid flu tests. Patient well-appearing and in no pain distress. PE 

findings and VS normal. Educated on viral illness and symptomatic treatment. 

Instructed to follow up with pcp for persistent symptoms and to go to ED with 

any new or worsening symptoms. Patient voiced understanding and agreed with 

treatment plan.








- Differential Dx/Diagnosis


Differential Diagnosis/HQI/PQRI: Bronchitis, Influenza, Upper Respiratory 

Infection


Provider Diagnosis: 


 Flu-like symptoms








Discharge ED





- Sign-Out/Discharge


Documenting (check all that apply): Patient Departure


All imaging exams completed and their final reports reviewed: No Studies





- Discharge Plan


Condition: Stable


Disposition: HOME


Patient Education Materials:  Acute Nausea and Vomiting (ED), Viral Syndrome (ED

)


Referrals: 


Abdulkadir Corado MD [Primary Care Provider] - If Needed


Additional Instructions: 


Your flu test was negative today.


Your symptoms are likely caused by a virus and should resolve without treatment.


You may take over the counter cough and cold medication for symptom relief.


Get plenty of rest and increase your fluid intake. Eat a bland diet while 

symptoms are present.


Follow up with your primary care provider if symptoms persist.


Return or go to the emergency room with any new or worsening symptoms.





- Billing Disposition and Condition


Condition: STABLE


Disposition: Home





- Attestation Statements


Provider Attestation: 





This patient was not seen by me.


I was available for consult.


Chart reviewed.


JONO

## 2020-01-29 NOTE — UC
Abdominal Pain Female HPI





- HPI Summary


HPI Summary: 





24 year old female s/p 2 vaginal deliveries presents with rectal pain x 24 

hours.    ++ severe with bowel movements, did notice bleeding iwth BM.  No 

abdominal pain, no N/V.   + h/o hemorrhoids in past, not this severe.    NO 

fever, chills.   appetite oK. 








- History of Current Complaint


Chief Complaint: UCGU


Stated Complaint: PERSONAL


Time Seen by Provider: 01/07/20 20:28


Hx Obtained From: Patient


Hx Last Menstrual Period: 12/17/19


Pregnant?: No


Onset/Duration: Sudden Onset, Lasting Days


Timing: Constant


Severity Initially: Moderate


Severity Currently: Moderate


Pain Intensity: 8


Pain Scale Used: 0-10 Numeric


Allergies/Adverse Reactions: 


 Allergies











Allergy/AdvReac Type Severity Reaction Status Date / Time


 


Adhesive Tape Allergy Intermediate SKIN SWELLS Verified 01/28/20 20:03


 


azithromycin Allergy  Vomiting Verified 01/28/20 20:03


 


haloperidol [From Haldol] Allergy  Itching Verified 01/28/20 20:03


 


latex Allergy  Swelling Verified 01/28/20 20:03


 


shellfish derived Allergy  Wheezing Verified 01/28/20 20:03














PMH/Surg Hx/FS Hx/Imm Hx


Previously Healthy: Yes





- Surgical History


Surgical History: Yes


Surgery Procedure, Year, and Place: 15 GI bx r/t c.diff





- Family History


Known Family History: Positive: Non-Contributory





- Social History


Alcohol Use: None


Substance Use Type: Marijuana


Smoking Status (MU): Heavy Every Day Tobacco Smoker


Type: Cigarettes


Amount Used/How Often: 1/2 PPD


Length of Time of Smoking/Using Tobacco: age 12


Have You Smoked in the Last Year: Yes


Household Exposure Type: Cigarettes





- Immunization History


Most Recent Influenza Vaccination: none


Most Recent Tetanus Shot: AGE 20





Review of Systems


All Other Systems Reviewed And Are Negative: Yes


Constitutional: Positive: Negative


Gastrointestinal: Positive: Other - rectal pain.  Negative: Abdominal Pain, 

Vomiting, Diarrhea


Genitourinary: Positive: Abnormal Bleeding


Is Patient Immunocompromised?: No





Physical Exam


Triage Information Reviewed: Yes


Appearance: Well-Appearing, No Pain Distress, Well-Nourished


Vital Signs: 


 Initial Vital Signs











Temp  99.3 F   01/07/20 20:15


 


Pulse  97   01/07/20 20:15


 


Resp  16   01/07/20 20:15


 


BP  116/74   01/07/20 20:15


 


Pulse Ox  100   01/07/20 20:15











Vital Signs Reviewed: Yes


Eyes: Positive: Conjunctiva Clear


ENT: Positive: Hearing grossly normal


Abdomen Description: Positive: Nontender, No Organomegaly, Soft.  Negative: 

Bruit, Distended, Guarding, Hepatomegaly, Splenomegaly


Bowel Sounds: Positive: Present


Pelvic Exam: Positive: Other - rectal exam- TTP over ~ 12 position with 

thrombosed hemorrhoid noted, no bleeding, no fissures noted.





Abd Pain Female Course/Dx





- Course


Course Of Treatment: 





- Anusol hydrocortisone suppository every 12 hours as needed for pain 


- Dibucaine as needed up to 6 times a day for pain externally 


- Maintain soft stools to prevent hemmorhoids 


- increase fluid intake 


- Follow up with GI physician for removal 





- Differential Dx/Diagnosis


Provider Diagnosis: 


 Hemorrhoid








Discharge ED





- Sign-Out/Discharge


Documenting (check all that apply): Patient Departure


All imaging exams completed and their final reports reviewed: No Studies





- Discharge Plan


Condition: Good


Disposition: HOME


Patient Education Materials:  Hydrocortisone (Into the rectum), Hemorrhoids (ED)


Referrals: 


Abdulkadir Corado MD [Primary Care Provider] - 


Breann Dodd MD [Medical Doctor] - 


Additional Instructions: 


- Anusol hydrocortisone suppository every 12 hours as needed for pain 


- Dibucaine as needed up to 6 times a day for pain externally 


- Maintain soft stools to prevent hemmorhoids 


- increase fluid intake 


- Follow up with GI physician for removal 








- Billing Disposition and Condition


Condition: GOOD


Disposition: Home

## 2020-04-08 ENCOUNTER — HOSPITAL ENCOUNTER (EMERGENCY)
Dept: HOSPITAL 25 - ED | Age: 25
Discharge: HOME | End: 2020-04-08
Payer: COMMERCIAL

## 2020-04-08 VITALS — SYSTOLIC BLOOD PRESSURE: 121 MMHG | DIASTOLIC BLOOD PRESSURE: 76 MMHG

## 2020-04-08 DIAGNOSIS — Z88.8: ICD-10-CM

## 2020-04-08 DIAGNOSIS — Z91.040: ICD-10-CM

## 2020-04-08 DIAGNOSIS — Z3A.00: ICD-10-CM

## 2020-04-08 DIAGNOSIS — Z88.1: ICD-10-CM

## 2020-04-08 DIAGNOSIS — Z79.899: ICD-10-CM

## 2020-04-08 DIAGNOSIS — F17.210: ICD-10-CM

## 2020-04-08 DIAGNOSIS — O99.330: ICD-10-CM

## 2020-04-08 DIAGNOSIS — O23.40: Primary | ICD-10-CM

## 2020-04-08 LAB — VIT C UR QL: (no result)

## 2020-04-08 PROCEDURE — 99282 EMERGENCY DEPT VISIT SF MDM: CPT

## 2020-04-08 PROCEDURE — 81003 URINALYSIS AUTO W/O SCOPE: CPT

## 2020-04-08 PROCEDURE — 84702 CHORIONIC GONADOTROPIN TEST: CPT

## 2020-04-08 PROCEDURE — 36415 COLL VENOUS BLD VENIPUNCTURE: CPT

## 2020-04-08 NOTE — ED
Pregnancy





- HPI Summary


HPI Summary: 


Pt. is a 25 y.o male who presents to the ER for repeat beta HCG. Pt. was seen 

in ED 2 days ago and was noted to have an elevated Beta HCG and u/s not showing 

IUP. Pt. states she was having lower abd. cramping this past week and that why 

she came to ER two days ago. Pt. notes cramping has improved. Pt. states she is 

unable to get a an apt with her OB and was told to have labs repeats in 2 days 

so she came back to ED. Pt. denies vaginal bleeding, urinary sxs. A0. Sxs 

are moderate in severity. No current modifying factors. 








- History of Current Complaint


Chief Complaint: EDOBProblems


Stated Complaint: FALL TWO DAYS AGO- PREGNANT,CRAMPING PER PT


Time Seen by Provider: 20 14:33


Hx Obtained From: Patient


Pain Intensity: 0





- Pregnancy Assessment


Hx Pregnant Now: No





- Allergies/Home Medications


Allergies/Adverse Reactions: 


 Allergies











Allergy/AdvReac Type Severity Reaction Status Date / Time


 


Adhesive Tape Allergy Intermediate SKIN SWELLS Verified 20 14:27


 


azithromycin Allergy  Vomiting Verified 20 14:27


 


haloperidol [From Haldol] Allergy  Itching Verified 20 14:27


 


latex Allergy  Swelling Verified 20 14:27


 


shellfish derived Allergy  Wheezing Verified 20 14:27











Home Medications: 


 Home Medications





QUEtiapine TAB* [Seroquel 25 MG TAB*] 100 mg PO BEDTIME 12/15/18 [History 

Confirmed 20]


guanFACINE TAB* [Tenex TAB*] 1 mg PO BEDTIME 19 [History Confirmed ]


Divalproex ER TAB(*) [Depakote ER TAB(*)] 500 mg PO DAILY 19 [History 

Confirmed 20]


Citalopram TAB* [CeleXA TAB*] 20 mg PO DAILY 20 [History Confirmed ]


Promethazine 25 mg TAB [Phenergan 25 mg TAB] 25 mg PO Q6H PRN 5 Days #20 tab  [Rx Confirmed 20]


clonazePAM TAB(*) [KlonoPIN TAB(*)] 0.25 mg PO BID 20 [History Confirmed 

20]


clonazePAM TAB(*) [KlonoPIN TAB(*)] 0.5 mg PO QAM 20 [History Confirmed ]











PMH/Surg Hx/FS Hx/Imm Hx


Previously Healthy: Yes


Endocrine/Hematology History: 


   Denies: Hx Diabetes


Cardiovascular History: 


   Denies: Hx Pacemaker/ICD


Respiratory History: Reports: Hx Asthma - exercised induced


 History: 


   Denies: Hx Dialysis


Sensory History: 


   Denies: Hx Eye Prosthesis, Hx Legally Blind, Hx Deafness


Opthamlomology History: 


   Denies: Hx Eye Prosthesis, Hx Legally Blind





- Surgical History


Surgery Procedure, Year, and Place: 15 GI bx r/t c.diff


Infectious Disease History: No


Infectious Disease History: Reports: Hx Clostridium Difficile - 2013


   Denies: Hx Hepatitis, Hx Human Immunodeficiency Virus (HIV), Hx of Known/

Suspected MRSA, Hx Shingles, Hx Tuberculosis, Hx Known/Suspected VRE, Hx Known/

Suspected VRSA, History Other Infectious Disease, Traveled Outside the  in 

Last 30 Days





- Family History


Known Family History: Positive: Non-Contributory





- Social History


Alcohol Use: None


Substance Use Type: Reports: Marijuana


Substance Use Comment - Amount & Last Used: daily


Smoking Status (MU): Light Every Day Tobacco Smoker


Type: Cigarettes


Amount Used/How Often: 1/2 PPD


Length of Time of Smoking/Using Tobacco: age 12


Have You Smoked in the Last Year: Yes





Review of Systems


Constitutional: Negative


Negative: Fever


Cardiovascular: Negative


Respiratory: Negative


Positive: Abdominal Pain


Genitourinary: Negative


Negative: dysuria, discharge


All Other Systems Reviewed And Are Negative: Yes





Physical Exam





- Physical  Exam


Triage Information Reviewed: Yes


Vital Signs Reviewed: Yes


Appearance: Positive: Well-Appearing - Pt. sitting up in bed in NAD.


Skin: Positive: Warm, Dry


Head/Face: Positive: Normal Head/Face Inspection


Eyes: Positive: Normal, EOMI


Neck: Positive: Supple


Respiratory/Lung Sounds: Positive: Clear to Auscultation, Breath Sounds Present


Cardiovascular: Positive: Normal, RRR


Abdomen Description: Positive: Nontender, Soft


Neurological: Positive: Normal, CN Intact II-III


Psychiatric: Positive: Affect/Mood Appropriate





Procedures





- Sedation


Patient Received Moderate/Deep Sedation with Procedure: No





Diagnostics





- Vital Signs


 Vital Signs











  Temp Pulse Resp BP Pulse Ox


 


 20 14:21  99.3 F  107  16  121/76  98














- Laboratory


Lab Statement: Any lab studies that have been ordered have been reviewed, and 

results considered in the medical decision making process.





Pregnancy Course/Dx





- Course


Course Of Treatment: Pt. presenting for repeat labs. Benign abd. exam. Will 

repeat betahcg and check u/a. Beta HCG 48 hours ago was 104. No benefit for 

repeat u/s today.  Beta HCG doubled today at 228. U/A negative for infection. 

Pt. has had no vaginal bleeding. Results discussed. Pt. to schedule f.u with 

OB. Should have u/s repeated in the 1-2 weeks. Will return for severe abd. pain

, bleeding or if concerned. Pt. understands and agrees with plan.





- Pregnancy


Differential Diagnosis/HQI/PQRI: Spontaneous , Threatened , 

Ectopic Pregnancy, Early Pregnancy, UTI





- Diagnoses


Provider Diagnoses: 


 Early stage of pregnancy








Discharge ED





- Sign-Out/Discharge


Documenting (check all that apply): Patient Departure





- Discharge Plan


Condition: Good


Disposition: HOME


Patient Education Materials:  Pregnancy (ED)


Referrals: 


Selam Rowland MD [Medical Doctor] - 


Abdulkadir Corado MD [Primary Care Provider] - 


Additional Instructions: 


Please call the OB tomorrow to schedule an appointment


Will need repeat ultrasound in the next 1-2 weeks


Return to ER for vaginal bleeding, severe pain or if concerned





- Billing Disposition and Condition


Condition: GOOD


Disposition: Home

## 2020-04-23 ENCOUNTER — HOSPITAL ENCOUNTER (EMERGENCY)
Dept: HOSPITAL 25 - UCCORT | Age: 25
Discharge: HOME | End: 2020-04-23
Payer: COMMERCIAL

## 2020-04-23 VITALS — DIASTOLIC BLOOD PRESSURE: 76 MMHG | SYSTOLIC BLOOD PRESSURE: 136 MMHG

## 2020-04-23 DIAGNOSIS — Z91.040: ICD-10-CM

## 2020-04-23 DIAGNOSIS — Z88.8: ICD-10-CM

## 2020-04-23 DIAGNOSIS — Z3A.01: ICD-10-CM

## 2020-04-23 DIAGNOSIS — F17.210: ICD-10-CM

## 2020-04-23 DIAGNOSIS — O21.0: Primary | ICD-10-CM

## 2020-04-23 DIAGNOSIS — Z91.048: ICD-10-CM

## 2020-04-23 DIAGNOSIS — Z91.013: ICD-10-CM

## 2020-04-23 DIAGNOSIS — Z88.1: ICD-10-CM

## 2020-04-23 PROCEDURE — 81003 URINALYSIS AUTO W/O SCOPE: CPT

## 2020-04-23 PROCEDURE — 84702 CHORIONIC GONADOTROPIN TEST: CPT

## 2020-04-23 PROCEDURE — 99212 OFFICE O/P EST SF 10 MIN: CPT

## 2020-04-23 PROCEDURE — 36415 COLL VENOUS BLD VENIPUNCTURE: CPT

## 2020-04-23 PROCEDURE — G0463 HOSPITAL OUTPT CLINIC VISIT: HCPCS

## 2020-04-23 NOTE — XMS REPORT
Continuity of Care Document (CCD)

 Created on:2020



Patient:Giovanni Munoz

Sex:Female

:1995

External Reference #:[id number]





Demographics







 Address  [address] [place] [address]

 

 Home Phone  [phone]

 

 Email Address  [email or web address]

 

 Preferred Language  en

 

 Marital Status  Not  or 

 

 Yazidi Affiliation  Unknown

 

 Race  White

 

 Ethnic Group  Not  or 









Author







 Name  Lul Tamayo JR, DO (transmitted by agent of provider Sandrita Chapman)

 

 Address  [address], [address]



   [place] [address]









Care Team Providers







 Name  Role  Phone

 

 Abdulkadir Corado M.D. - Family  Care Team Information   [phone]
[phone]



 Medicine    









Problems







 Description

 

 No Information Available







Social History







 Type  Date  Description  Comments

 

 Birth Sex    Unknown  

 

 Cigarette Use    Current Cigarette Smoker 1/2 Pack  



     Daily  

 

 ETOH Use    Denies alcohol use  

 

 Recreational Drug Use    Denies Drug Use  

 

 Tobacco Use  Start: Unknown  Patient is a current smoker,  



     smokes every day  

 

 Smoking Status  Reviewed: 20  Patient is a current smoker,  



     smokes every day  

 

 Exercise Type/Frequency    Exercises regularly  

 

 Seat Belt/Car Seat    Always uses seat belt  







Allergies, Adverse Reactions, Alerts







 Active Allergies  Reaction  Severity  Comments  Date

 

 Shellfish-Derived Products  Wheezing      2020

 

 Adhesives  swelling      2020

 

 Haloperidol  Itching      2020

 

 Azithromycin  Nausea      2020

 

 Latex  swelling      2020







Medications







 Active Medications  SIG  Qnty  Indications  Ordering Provider  Date

 

 Doxylamine  take 2 tabs and  120tabs    Lul Tamayo JR,  2020



 Succinate/Pyridoxine  bedtime, may      DO  



 Hydrochloride  also take one        



            10-10mg  in the am and        



 Tablets DR  one in the        



   afternoon.        

 

 Folic Acid  Take one tablet  30tabs    Lul Tamayo JR,  2020



         1mg Tablets  daily.      DO  



           

 

 Guanfacine HCL  1 po qhs      Unknown  



             1mg          



 Tablets          



           

 

 Divalproex Sodium  1 po qd      Unknown  



                500mg          



 Tablets           



           

 

 Citalopram  1 by mouth      Unknown  



 Hydrobromide  every day        



           20mg Tablets          



           

 

 Promethazine HCL  1 tab by mouth      Unknown  



               25mg  every 4-6 hours        



 Tablets  prn        



           

 

 Klonopin  1 po bid prn      Unknown  



       1mg Tablets          



           









 History Medications









 Folic Acid  Take one capsule  30caps    Lul Tamayo JR,  2020 -



            5mg  daily.      DO  2020



 Capsules          



           







Medications Administered in Office







 Medication  SIG  Qnty  Indications  Ordering Provider  Date

 

 PT SCRN Tbco Id as Non User        Lul Tamayo JR, DO  2020



                Injection          



           







Immunizations







 Description

 

 No Information Available







Vital Signs







 Date  Vital  Result  Comment

 

 2020 11:07am  BP Systolic  126 mmHg  









 BP Diastolic  80 mmHg  

 

 Height  64 inches  5'4"

 

 Weight  133.00 lb  

 

 BMI (Body Mass Index)  22.8 kg/m2  

 

 Last Menstrual Period  1308630  

 

   3  

 

 Parity  2  







Results







 Test  Acquired Date  Facility  Test  Result  H/L  Range  Note

 

 Laboratory test  2020  [facility]  HCG Pregnancy  6627.00    
  [address]



 [place] [address]    mIU/mL      



     [phone]          









 1  <5.0 Negative



   



   5.0 - 25.0  Indeterminate (Repeat testing recommended after



   72 hours)



   >25.0  Positive



   



   Perimenopausal women can display HCG levels of up to 20



   mIU/mL







Procedures







 Description

 

 No Information Available







Medical Devices







 Description

 

 No Information Available







Encounters







 Type  Date  Location  Provider  Dx  Diagnosis

 

 Office Visit  2020  [facility]  Lul Tamayo JR,  O36.80x0  Pregnancy w



   11:00a    DO    inconclusive fetal



           viability, unsp







Assessments







 Date  Code  Description  Provider

 

 2020  O36.80x0  Pregnancy with inconclusive fetal viability,  Lul Tamayo JR, DO



     not applicable or unspecified  







Plan of Treatment

Future Appointment(s):2020 11:30 am - Ultrasounds at [facility]2020 11:45 am - Vamsi Francisco MD at [facility]



Functional Status







 Description

 

 No Information Available







Mental Status







 Description

 

 No Information Available







Referrals







 Description

 

 No Information Available

## 2020-04-23 NOTE — UC
Nausea/Vomiting/Diarrhea HPI





- HPI Summary


HPI Summary: 





25-year-old female with nausea and vomiting over the past 24 hours.  She is 

approximately 6-1/2 weeks gestation with her third pregnancy.  She went to 

[place] emergency room last evening for the same and was given IV fluids.  She 

states she has smoked marijuana the past 2 days for nausea but that hasn't 

helped and then her vomiting started yesterday.  She had a transvaginal 

sonogram done in the beginning of April which showed no gestational sac or 

fetal pole.  Her quantitative hCG at that time was 6627.  She stopped all of 

her medications per her primary care provider however was told by her OB/GYN 

provider that she can start all of them stopped the Depakote.  She has not yet 

started them.  She was told by the emergency department she had a urinary tract 

infection however when she went to the pharmacy she states she was unable to 

afford her medication and therefore did not fill the prescription.  We were 

unable to locate any antibiotics sent to the pharmacy from [place] emergency 

room.  She denies any spotting or vaginal bleeding no abnormal vaginal 

discharge.  She does have some abdominal cramping which she has had throughout 

this entire pregnancy so far.





- History of Current Complaint


Chief Complaint: UCGeneralIllness


Stated Complaint: VOMITING


Time Seen by Provider: 04/23/20 18:26


Hx Obtained From: Patient


Hx Last Menstrual Period: 1/14/20


Pregnant?: Yes


Onset/Duration: Gradual Onset, Other - The patient has had nausea throughout 

her pregnancy but has been vomiting the last 2 days.  She did smoke marijuana 

today and the day before to ease the nausea however she started vomiting the 

past 24 hours.  She states occasionally she has fever and sweats.


Severity Initially: Mild


Severity Currently: Moderate


Pain Intensity: 9


Location: Diffuse


Character: Cramping


Aggravating Factor(s): Nothing


Alleviating Factor(s): Nothing


Nausea/Vomiting Presence: Nauseated, Vomiting


Vomiting Frequency: Every 15-60 minutes


Nausea/Vomiting Duration: patient has had nausea throughout her pregnancy 

however the vomiting started over the past 2 days more frequently.


Vomiting Characteristics: Retching


Diarrhea Presence: No





- Allergies/Home Medications


Allergies/Adverse Reactions: 


 Allergies











Allergy/AdvReac Type Severity Reaction Status Date / Time


 


Adhesive Tape Allergy Intermediate SKIN SWELLS Verified 04/23/20 18:32


 


azithromycin Allergy  Vomiting Verified 04/23/20 18:32


 


haloperidol [From Haldol] Allergy  Itching Verified 04/23/20 18:32


 


latex Allergy  Swelling Verified 04/23/20 18:32


 


shellfish derived Allergy  Wheezing Verified 04/23/20 18:32











Home Medications: 


 Home Medications





guanFACINE TAB* [Tenex TAB*] 1 mg PO BEDTIME 04/13/19 [History Confirmed 04/23/ 20]


Divalproex ER TAB(*) [Depakote ER TAB(*)] 500 mg PO DAILY 11/09/19 [History 

Confirmed 04/23/20]


Citalopram TAB* [CeleXA TAB*] 20 mg PO DAILY 01/28/20 [History Confirmed 04/23/ 20]


Promethazine 25 mg TAB [Phenergan 25 mg TAB] 25 mg PO Q6H PRN 5 Days #20 tab 04/ 06/20 [Rx Confirmed 04/23/20]


clonazePAM TAB(*) [KlonoPIN TAB(*)] 0.25 mg PO BID 04/06/20 [History Confirmed 

04/23/20]


clonazePAM TAB(*) [KlonoPIN TAB(*)] 0.5 mg PO QAM 04/06/20 [History Confirmed 04 /23/20]


Ondansetron TAB* [Zofran 4 MG Tab*] 4 mg PO Q8H PRN #10 tab 04/23/20 [Rx]











PMH/Surg Hx/FS Hx/Imm Hx


Previously Healthy: Yes


Respiratory History: Asthma





- Surgical History


Surgical History: Yes


Surgery Procedure, Year, and Place: 15 GI bx r/t c.diff





- Family History


Known Family History: Positive: Unknown, Non-Contributory





- Social History


Lives: With Family - Patient has a 5 and 7-year-old at home


Alcohol Use: None


Substance Use Type: Marijuana


Substance Use Comment - Amount & Last Used: daily


Smoking Status (MU): Light Every Day Tobacco Smoker


Type: Cigarettes


Amount Used/How Often: 1/2 PPD


Length of Time of Smoking/Using Tobacco: age 12


Have You Smoked in the Last Year: Yes


Household Exposure Type: Cigarettes





- Immunization History


Most Recent Influenza Vaccination: none


Most Recent Tetanus Shot: AGE 20





Review of Systems


All Other Systems Reviewed And Are Negative: Yes


Constitutional: Positive: Fever - States occasionally she has fever and sweats.


Gastrointestinal: Positive: Abdominal Pain - Patient complains of abdominal 

cramping which she has had over the past month.  She denies any vaginal 

discharge or bleeding., Vomiting, Nausea


Genitourinary: Positive: Dysuria


Psychological: Positive: Anxious


Is Patient Immunocompromised?: No





Physical Exam


Triage Information Reviewed: Yes


Appearance: Well-Appearing, No Pain Distress, Well-Nourished


Vital Signs: 


 Initial Vital Signs











Temp  97.8 F   04/23/20 18:28


 


Pulse  70   04/23/20 18:28


 


Resp  21   04/23/20 18:28


 


BP  128/82   04/23/20 18:28


 


Pulse Ox  99   04/23/20 18:28











Vital Signs Reviewed: Yes


Eyes: Positive: Conjunctiva Clear


ENT: Positive: Pharynx normal, TMs normal, Uvula midline


Neck: Positive: Supple, Nontender, No Lymphadenopathy


Respiratory: Positive: Lungs clear, Normal breath sounds, No respiratory 

distress, No accessory muscle use


Cardiovascular: Positive: RRR, No Murmur, Pulses Normal, Brisk Capillary Refill


Abdomen Description: Positive: No Organomegaly, Soft.  Negative: CVA Tenderness 

(R), CVA Tenderness (L), Distended, Guarding, Hepatomegaly, Splenomegaly


Bowel Sounds: Positive: Present


Musculoskeletal Exam: Normal


Neurological Exam: Normal


Psychological: Positive: Other: - Patient is teary-eyed at times.  She had 

stopped her medication over the past one and half weeks.  She is very anxious.  

At one point she stated she's not sure if she wants to continue with the 

pregnancy.





Naus/Vom/Diarrhea Course/Dx





- Course


Course Of Treatment: 





The patient was given Zofran 4 mg sublingual here.  She is not presently 

orthostatic.  The Zofran resolved her vomiting.


Urinalysis: Negative





Although I gave her a prescription for Zofran I did advise her that she can 

return back to the antinausea medicine given by her primary care provider.  She 

is to go home and rest, increase fluids and restart her medications except the 

Depakote.  She then advise me she had an OB/GYN appointment this morning which 

she missed because her car was in the emergency room in [place] and today she 

was supposed to get a sonogram.  I advised her to call her OB/GYN office 

tomorrow and rescheduled that to Monday or Tuesday and by then they will also 

have the results of the quantitative hCG.  The patient is stable and much 

calmer no longer teary-eyed.  She does not have a mental health provider 

however her OB/GYN physician referred her to [facility].  

She is going to call them and make an appointment.





- Differential Dx/Diagnosis


Provider Diagnosis: 


 Nausea & vomiting





Is Visit Pregnancy Related: Yes - Possibly


Condition At Discharge: Good





Discharge ED





- Sign-Out/Discharge


Documenting (check all that apply): Patient Departure


All imaging exams completed and their final reports reviewed: No Studies





- Discharge Plan


Condition: Good


Disposition: HOME


Prescriptions: 


Ondansetron TAB* [Zofran 4 MG Tab*] 4 mg PO Q8H PRN #10 tab


 PRN Reason: Nausea


Patient Education Materials:  Hyperemesis Gravidarum (ED)


Referrals: 


Abdulkadir Corado MD [Primary Care Provider] - 


Additional Instructions: 


Increase fluids, start all of your medications except the Depakote.  You can 

start your other anti-nausea medicine tonight. Do not take the Zofran and the 

other anti-nausea medicine. It is better to take the medicine for nausea your 

doctor gave you because of the side effects of the Zofran with the medications 

you will be re-starting. Stop smoking marijuana.  Call your OB/GYN office 

tomorrow and rescheduled the sonogram for Monday or Tuesday.  The blood work 

should be back by then with the results.





- Billing Disposition and Condition


Condition: GOOD


Disposition: Home

## 2020-04-23 NOTE — XMS REPORT
Continuity of Care Document (CCD)

 Created on:2020



Patient:Giovanni Munoz

Sex:Female

:1995

External Reference #:[id number]





Demographics







 Address  [address] [place] [address]

 

 Home Phone  [phone]

 

 Email Address  [email or web address]

 

 Preferred Language  Unknown

 

 Marital Status  Unknown

 

 Caodaism Affiliation  Unknown

 

 Race  Unknown

 

 Ethnic Group  Unknown









Author







 Name  Lul Tamayo JR, DO (transmitted by agent of provider Marine Gomez)

 

 Address  [address]



   [place] [address]









Care Team Providers







 Name  Role  Phone

 

 Abdulkadir Corado M.D. - Family  Care Team Information   [phone]
[phone]



 Medicine    









Problems







 Description

 

 No Information Available







Social History







 Type  Date  Description  Comments

 

 Birth Sex    Unknown  

 

 Cigarette Use    Current Cigarette Smoker 1/2 Pack  



     Daily  

 

 ETOH Use    Denies alcohol use  

 

 Recreational Drug Use    Denies Drug Use  

 

 Tobacco Use  Start: Unknown  Patient is a current smoker,  



     smokes every day  

 

 Smoking Status  Reviewed: 20  Patient is a current smoker,  



     smokes every day  

 

 Exercise Type/Frequency    Exercises regularly  

 

 Seat Belt/Car Seat    Always uses seat belt  







Allergies, Adverse Reactions, Alerts







 Active Allergies  Reaction  Severity  Comments  Date

 

 Shellfish-Derived Products  Wheezing      2020

 

 Adhesives  swelling      2020

 

 Haloperidol  Itching      2020

 

 Azithromycin  Nausea      2020

 

 Latex  swelling      2020







Medications







 Active Medications  SIG  Qnty  Indications  Ordering Provider  Date

 

 Doxylamine  take 2 tabs and  120tabs    Lul Tamayo JR,  2020



 Succinate/Pyridoxine  bedtime, may      DO  



 Hydrochloride  also take one        



            10-10mg  in the am and        



 Tablets DR  one in the        



   afternoon.        

 

 Folic Acid  Take one tablet  30tabs    Lul Tamayo JR,  2020



         1mg Tablets  daily.      DO  



           

 

 Guanfacine HCL  1 po qhs      Unknown  



             1mg          



 Tablets          



           

 

 Divalproex Sodium  1 po qd      Unknown  



                500mg          



 Tablets DR          



           

 

 Citalopram  1 by mouth      Unknown  



 Hydrobromide  every day        



           20mg Tablets          



           

 

 Promethazine HCL  1 tab by mouth      Unknown  



               25mg  every 4-6 hours        



 Tablets  prn        



           

 

 Klonopin  1 po bid prn      Unknown  



       1mg Tablets          



           









 History Medications









 Folic Acid  Take one capsule  30caps    Lul Tamayo JR,  2020 -



            5mg  daily.      DO  2020



 Capsules          



           







Medications Administered in Office







 Medication  SIG  Qnty  Indications  Ordering Provider  Date

 

 PT SCRN Tbco Id as Non User        Lul Tamayo JR, DO  2020



                Injection          



           







Immunizations







 Description

 

 No Information Available







Vital Signs







 Date  Vital  Result  Comment

 

 2020 11:07am  BP Systolic  126 mmHg  









 BP Diastolic  80 mmHg  

 

 Height  64 inches  5'4"

 

 Weight  133.00 lb  

 

 BMI (Body Mass Index)  22.8 kg/m2  

 

 Last Menstrual Period  4803632  

 

   3  

 

 Parity  2  







Results







 Test  Acquired Date  Facility  Test  Result  H/L  Range  Note

 

 Laboratory test  2020  [facility]  HCG Pregnancy  <pending>  
    



 [place] [address]          



     [phone]          







Procedures







 Description

 

 No Information Available







Medical Devices







 Description

 

 No Information Available







Encounters







 Type  Date  Location  Provider  Dx  Diagnosis

 

 Office Visit  2020  [facility]  Lul Tamayo JR,  O36.80x0  Pregnancy w



   11:00a    DO    inconclusive fetal



           viability, unsp







Assessments







 Date  Code  Description  Provider

 

 2020  O36.80x0  Pregnancy with inconclusive fetal viability,  Lul Tamayo JR, DO



     not applicable or unspecified  







Plan of Treatment

Future Appointment(s):2020 11:30 am - Ultrasounds at [facility]2020 11:45 am - Vamsi Francisco MD at [facility]



Functional Status







 Description

 

 No Information Available







Mental Status







 Description

 

 No Information Available







Referrals







 Description

 

 No Information Available

## 2020-04-24 NOTE — UC
- Progress Note


Progress Note: 





Please call to advise that her HCG continues to rise, consistent with a viable 

pregnancy. 


She was seen last night with nausea and vomiting, and was given ondansetron. 

This helped significantly. She was given an rx--might not have been covered by 

her insurance, but she was advised to follow up with OB as soon as possible. 


Please call to check in. 





Course/Dx





- Diagnoses


Provider Diagnoses: 


 Nausea & vomiting





Is Visit Pregnancy Related: Yes - Possibly





Discharge ED





- Sign-Out/Discharge


Documenting (check all that apply): Post-Discharge Follow Up


All imaging exams completed and their final reports reviewed: No Studies





- Discharge Plan


Condition: Good


Disposition: HOME


Prescriptions: 


Ondansetron TAB* [Zofran 4 MG Tab*] 4 mg PO Q8H PRN #10 tab


 PRN Reason: Nausea


Patient Education Materials:  Hyperemesis Gravidarum (ED)


Referrals: 


Abdulkadir Corado MD [Primary Care Provider] - 


Additional Instructions: 


Increase fluids, start all of your medications except the Depakote.  You can 

start your other anti-nausea medicine tonight. Do not take the Zofran and the 

other anti-nausea medicine. It is better to take the medicine for nausea your 

doctor gave you because of the side effects of the Zofran with the medications 

you will be re-starting. Stop smoking marijuana.  Call your OB/GYN office 

tomorrow and rescheduled the sonogram for Monday or Tuesday.  The blood work 

should be back by then with the results.





- Billing Disposition and Condition


Condition: GOOD


Disposition: Home

## 2020-12-28 NOTE — XMS REPORT
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  4.3-13.2  

 

 Eo%  0.5 %  N  0.0-6.6  

 

 Bas%  0.3 %  N  0.0-1.1  

 

 Neut#  5.08 K/uL  N  1.8-7.0  

 

 Lymph #  1.80 K/uL  N  1.0-4.0  

 

 Mono #  0.90 K/uL  N  0.3-0.9  

 

 Eos #  0.04 K/uL  N  0.0-0.5  

 

 Baso #  0.02 K/uL  N  0.0-0.1  









 CBS W/Automated  2018  [facility]  White Blood  7.7 K/uL  N  3.1-10.7  30



 Diff    [address]  Count        



     [place] [address] [phone]          









 Red Blood Count  5.17 M/uL  N  3.90-5.40  

 

 Hemoglobin  14.7 gm/dL  N  11.6-15.8  

 

 Hematocrit  43.5 %  N  36.0-46.1  

 

 Mean Cell Volume  84.1 fl  N  80.9-99.0  

 

 Mean Corpuscular HGB  28.4 pg  N  25.9-32.7  

 

 Mean Corpuscular HGB Conc  33.8 g/dL  N  30.8-34.3  

 

 Platelet Count  357 K/uL  N  155-360  

 

 Red Cell Distri Width SD  42.0 fl  N  3-47  

 

 Red Cell Distri Width %CV  14.0 %  N  11.7-14.4  

 

 Mean Platelet Volume  10.0 fL  N  8.9-12.4  

 

 Neut%  86.1 %  High  40.4-72.8  

 

 Lymph %  9.9 %  Low  20.0-42.0  

 

 Mono %  3.8 %  Low  4.3-13.2  

 

 Eo%  0.1 %  N  0.0-6.6  

 

 Bas%  0.1 %  N  0.0-1.1  

 

 Neut#  6.64 K/uL  N  1.8-7.0  

 

 Lymph #  0.76 K/uL  Low  1.0-4.0  

 

 Mono #  0.29 K/uL  Low  0.3-0.9  

 

 Eos #  0.01 K/uL  N  0.0-0.5  

 

 Baso #  0.01 K/uL  N  0.0-0.1  









 Comprehensive Metabolic  2018  [facility]  Glucose  96 mg/dL  N    31



 Panel    [address]          



     [place] [address] [phone]          









 BUN  12 mg/dL  N  7-18  

 

 Creatinine  0.7 mg/dL  N  0.6-1.3  

 

 Glom Filtration Rate, Estimate  >60 mL/min    >60  

 

 If African American  >60 mL/min    >60  32

 

 BUN/Creat  17.1 ratio      

 

 Sodium  143 mmol/L  N  136-145  

 

 Potassium  3.2 mmol/L  Low  3.5-5.1  

 

 Chloride  108 mmol/L  High    

 

 Carbon Dioxide  25 mmol/L  N  21-32  

 

 Anion Gap  10 mEq/L  N  8-16  

 

 Calcium  8.8 mg/dL  N  8.5-10.1  

 

 Total Protein  7.9 g/dL  N  6.4-8.2  

 

 Albumin  4.4 g/dL  N  3.4-5.0  

 

 Globulin  3.5 g/dL  N  1.9-4.3  

 

 Alb/Glob  1.3 ratio      

 

 Bilirubin,Total  1.3 mg/dL  High  0.2-1.0  

 

 Sgot/Ast  15 U/L  N  15-37  

 

 SGPT/Alt  21 U/L  N  12-78  

 

 Alkaline Phosphatase  58 U/L  N    









 CBS W/Automated Diff  2018  [facility]  White Blood  7.8 K/uL  N  3.1-10.7  



     [address]  Count        



     [place] [address] [phone]          









 Red Blood Count  5.17 M/uL  N  3.90-5.40  

 

 Hemoglobin  14.7 gm/dL  N  11.6-15.8  

 

 Hematocrit  43.1 %  N  36.0-46.1  

 

 Mean Cell Volume  83.4 fl  N  80.9-99.0  

 

 Mean Corpuscular HGB  28.4 pg  N  25.9-32.7  

 

 Mean Corpuscular HGB Conc  34.1 g/dL  N  30.8-34.3  

 

 Platelet Count  259 K/uL  N  155-360  

 

 Red Cell Distri Width SD  41.1 fl  N  3-47  

 

 Red Cell Distri Width %CV  13.8 %  N  11.7-14.4  

 

 Mean Platelet Volume  10.5 fL  N  8.9-12.4  

 

 Neut%  77.3 %  High  40.4-72.8  

 

 Lymph %  13.6 %  Low  20.0-42.0  

 

 Mono %  8.5 %  N  4.3-13.2  

 

 Eo%  0.5 %  N  0.0-6.6  

 

 Bas%  0.1 %  N  0.0-1.1  

 

 Neut#  6.00 K/uL  N  1.8-7.0  

 

 Lymph #  1.06 K/uL  N  1.0-4.0  

 

 Mono #  0.66 K/uL  N  0.3-0.9  

 

 Eos #  0.04 K/uL  N  0.0-0.5  

 

 Baso #  0.01 K/uL  N  0.0-0.1  









 Laboratory test finding  2011  [facility]  FSH  5.3 mIU/mL      33



     [address] [place] [address] [phone]          









 Luteinizing Hormone  13.4 mIU/mL      34

 

 Estrogen,Total  38 pg/mL    .  35









 PCOS  2011  [facility]  Prolactin  12.7 ng/mL    3.24-29.12  36



     [address] [place] [address] [phone]          









 Insulin  8.3 uIU/mL    0.0-24.9  37

 

 Thyroid Stim Hormone  2.32 uIU/mL    0.49-4.67  38

 

 Free T4  1.06 ng/dL    0.71-1.85  39

 

 Testosterone,Serum  72 ng/dL    .  40

 

 Dehydroepiandrosterone Sulfate  392.3 g/dL  High  65.1-368.0  41

 

 2 Hour GTT  See Note mg/dL      42

 

 HCG, Quant  < 1.0 mIU/mL      43

 

 Hydroxyprogesterone,17-Alpha  123 ng/dL    .  44









 Testosterone,Free/Weakly  2011  [facility]  Testosterone,%Free/Weakly  9.6    
3.0-18.0  



 Bound    [address]  BND  %      



     [place], [place] [id number] [phone]          









 Testosterone,Free+Weakly Bound  6.9 ng/dL    0.0-9.5  45









 LDL Cholesterol  2011  [facility]  Cholesterol  172 mg/dL    120-200  



 Profile    [address] [place], [place] [id number] [phone]          









 Triglycerides  82 mg/dL    0-210  

 

 HDL Cholesterol  68 mg/dL    32-96  

 

 LDL-Cholesterol  88 mg/dL      









 Laboratory test  2011  [facility]  Cortisol,Am  22.0 g/dL    4.2-38.4  46



 finding    [address] [place] [address] [phone]          









 Acth,Plasma  42.8 pg/mL    7.2-63.3  47









 1  PELVIC PAIN

 

 2  URINE, CLEAN CATCH

 

 3  A negative result for either  C. trachomatis and/or



   N. gonorrhoeae does not preclued an infection because



   results are dependent on adequate specimen collection,



   absence of inhibitors, and sufficient DNA to be detected.

 

 4  PSYCH SERVICES

 

 5  VOMITING, DEHYDRATION

 

 6  Note:



   Persistent reduction for 3 months or more in an eGFR <60



   mL/min/1.73 m2 defines CKD.  Patients with eGFR values >/=60



   mL/min/1.73 m2 may also have CKD if evidence of persistent



   proteinuria is present.



   



   The original MDRD equation for estimated GFR is not valid



   for patients less than 18 years of age.



   



   Additional information may be found at www.[email or web address].

 

 7  Note:



   Persistent reduction for 3 months or more in an eGFR <60



   mL/min/1.73 m2 defines CKD.  Patients with eGFR values >/=60



   mL/min/1.73 m2 may also have CKD if evidence of persistent



   proteinuria is present.



   



   The original MDRD equation for estimated GFR is not valid



   for patients less than 18 years of age.



   



   Additional information may be found at [email or web address].

 

 8  VOMITING

 

 9  URINE, CLEAN CATCH

 

 10  Note:



   Persistent reduction for 3 months or more in an eGFR <60



   mL/min/1.73 m2 defines CKD.  Patients with eGFR values >/=60



   mL/min/1.73 m2 may also have CKD if evidence of persistent



   proteinuria is present.



   



   The original MDRD equation for estimated GFR is not valid



   for patients less than 18 years of age.



   



   Additional information may be found at [email or web address].

 

 11  Values below the stated reference ranges of AST and ALT can



   be seen in normal populations. Clinical correlation is



   suggested.

 

 12  Method: [employer] QuickVue One-Step Immunoassay

 

 13  Instrument flagged sample for slide review.



   Less than 10% Bands seen, no other immature WBC's seen.



   RBC morphology essentially normal.



   Platelet estimate=NORMAL

 

 14  ANXIETY

 

 15  THERAPEUTIC RANGE:  15-30 mg/dL



   



   POTENTIAL TOXICITY VARIES WITH TIME FROM INGESTION.  PLEASE



   CONSULT APPROPRIATE NOMOGRAM.

 

 16  Acetaminophen concentration >150 ug/mL at four hours after



   ingestion and 50.0 ug/mL at twelve hours after ingestion are



   often associated with toxic reactions.

 

 17  Note:



   Persistent reduction for 3 months or more in an eGFR <60



   mL/min/1.73 m2 defines CKD.  Patients with eGFR values >/=60



   mL/min/1.73 m2 may also have CKD if evidence of persistent



   proteinuria is present.



   



   The original MDRD equation for estimated GFR is not valid



   for patients less than 18 years of age.



   



   Additional information may be found at www.[email or web address].

 

 18  URINE SPECIMENS ARE SCREENED AT THE LISTED



   CUTOFFS



   



   DRUG CLASS                 INITIAL TEST LEVEL



   



   Amphetamines                   1000 ng/mL



   Barbiturates                    200 ng/mL



   Benzodiazepines                 200 ng/mL



   Cannabinoids                     50 ng/mL



   Cocaine Metabolite              300 ng/mL



   Methadone                       300 ng/mL



   Opiates                         300 ng/mL



   



   Any PRESUMPTIVE POSITIVE findings are UNCONFIRMED.



   Confirmatory testing is suggested if findings are



   unexpected.



   Please contact laboratory if confirmatory testing is



   desired. SPECIMENS ARE HELD FOR 72 HOURS.

 

 19  FIRST MORNING SPECIMENS GENERALLY CONTAIN THE HIGHEST



   CONCENTRATION OF HCG AND ARE RECOMMENDED FOR EARLY DETECTION



   OF PREGNANCY.



   



   Method: [employer] QuickVue One-Step Immunoassay

 

 20  URINE, CLEAN CATCH

 

 21  POSSIBLE  UROGENITAL CONTAMINATION.

 

 22  URINE, CLEAN CATCH

 

 23  > 100,000 CFU/mL

 

 24  PSYCHIATRIC SERVICES

 

 25  Performed at:  [address]  [id number]



   : Hunter Castillo MD, Phone:  [phone]

 

 26  CYCLIC VOMITING

 

 27  Note:



   Persistent reduction for 3 months or more in an eGFR <60



   mL/min/1.73 m2 defines CKD.  Patients with eGFR values >/=60



   mL/min/1.73 m2 may also have CKD if evidence of persistent



   proteinuria is present.



   



   The original MDRD equation for estimated GFR is not valid



   for patients less than 18 years of age.



   



   Additional information may be found at www.[email or web address].

 

 28  URINE SPECIMENS ARE SCREENED AT THE LISTED



   CUTOFFS



   



   DRUG CLASS                 INITIAL TEST LEVEL



   



   Amphetamines                   1000 ng/mL



   Barbiturates                    200 ng/mL



   Benzodiazepines                 200 ng/mL



   Cannabinoids                     50 ng/mL



   Cocaine Metabolite              300 ng/mL



   Methadone                       300 ng/mL



   Opiates                         300 ng/mL



   



   Any PRESUMPTIVE POSITIVE findings are UNCONFIRMED.



   Confirmatory testing is suggested if findings are



   unexpected.



   Please contact laboratory if confirmatory testing is



   desired. SPECIMENS ARE HELD FOR 72 HOURS.

 

 29  Note:



   Persistent reduction for 3 months or more in an eGFR <60



   mL/min/1.73 m2 defines CKD.  Patients with eGFR values >/=60



   mL/min/1.73 m2 may also have CKD if evidence of persistent



   proteinuria is present.



   



   The original MDRD equation for estimated GFR is not valid



   for patients less than 18 years of age.



   



   Additional information may be found at www.[email or web address].

 

 30  VOMITING, FEVER?

 

 31  DR HERNADEZ SENT FOR EMESIS

 

 32  Note:



   Persistent reduction for 3 months or more in an eGFR <60



   mL/min/1.73 m2 defines CKD.  Patients with eGFR values >/=60



   mL/min/1.73 m2 may also have CKD if evidence of persistent



   proteinuria is present.



   



   The original MDRD equation for estimated GFR is not valid



   for patients less than 18 years of age.



   



   Additional information may be found at [email or web address].

 

 33  NORMALLY MENSTRUATING FEMALES:



   Follicular Phase:...............4-13 mIU/mL



   Mid-Cycle Peak:.................5-22 mIU/mL



   Luteal Phase:...................2-13 mIU/mL



   Postmenopausal Female:........ mIU/mL

 

 34  NORMALLY MENSTRUATING FEMALES:



   Follicular Phase.............1-18 mIU/mL



   Mid-Cycle Peak............. mIU/mL



   Luteal Phase...............0.4-20 mIU/mL



   Postmenopausal .............15-62 mIU/mL

 

 35  Prepubertal           <40



   Female Cycle:



   1-10 Days      61 - 394



   11-20 Days     122 - 437



   21-30 Days     156 - 350



   Post-Menopausal      <40



   [facility] Ovulation



   Induction:     400 - 800



   Performed at:  [address]  [id number]



   : Hunter Castillo MD, Phone:  [phone]

 

 36  QUERY: @[employer] Pat ID:  [id number]



   QUERY: @[employer] Req #:  [id number]

 

 37  QUERY: @[employer] Pat ID:  [id number]



   QUERY: @[employer] Req #:  [id number]



   QUERY: @This code for SINGLE insulin level. Use [id number] (6 spec



   QUERY: @[id number] for Insulin (5 Specimens)



   QUERY: @Order [id number] (4 Specimens)



   QUERY: Is the Patient Fasting?  Y

 

 38  QUERY: @[employer] Pat ID:  [id number]



   QUERY: @[employer] Req #:  [id number]

 

 39  QUERY: @[employer] Pat ID:  [id number]



   QUERY: @[employer] Req #:  [id number]

 

 40  FEMALE DEZ STAGE



   1           <3 -   6



   2           <3 -  10



   3           <3 -  24



   4           <3 -  27



   5            5 -  38

 

 41  QUERY: @[employer] Pat ID:  [id number]



   QUERY: @[employer] Req #:  [id number]

 

 42  FASTING GLUCOSE  96  mg/dL



   1/2 HR GLUCOSE  157  mg/dL



   1 HR GLUCOSE  102  mg/dL



   2 HR GLUCOSE  93  mg/dL



   FAST URINE GLU  NEGATIVE  %



   FAST URINE KET  NEGATIVE



   1/2HR URINE GLU  NEGATIVE  %



   1/2HR URINE KET  NEGATIVE



   1HR URINE GLU  NO SPECIMEN RECEIVED  %



   1HR URINE KET  NO SPECIMEN RECEIVED



   2HR URINE GLU  NEGATIVE  %



   2HR URINE KET  NEGATIVE

 

 43  APPROXIMATE GESTATIONAL AGE AND BHCG RANGE



   0-1 WEEK.......................1-50 mIU/mL



   1-2 WEEKS.................... mIU/mL



   2-3 WEEKS.................100-1,000 mIU/mL



   3-4 WEEKS.................500-6,000 mIU/mL



   1-2 MONTHS............5,000-200,000 mIU/mL



   2-3 MONTHS...........10,000-100,000 mIU/mL



   2nd TRIMESTER..........3,000-50,000 mIU/mL



   3rd TRIMESTER..........1,000-50,000 mIU/mL

 

 44  Dez Stage    Female



   1         0 -  82



   2        11 -  98



   3        11 - 155



   4        18 - 230



   5        20 - 265

 

 45  Performed at:  [facility]  [id number]



   : Abdulkadir Villatoro MD, Phone:  [phone]



   Performed at:  [facility]  [id number]



   : Hunter Castillo MD, Phone:  [phone]

 

 46  QUERY: @[facility] Pat ID:  [id number]



   QUERY: @[facility] Req #:  [id number]

 

 47  ACTH reference interval for samples collected between 7 and



   10 AM.







Procedures







 Date  Code  Description  Status

 

 2018  59947  EKG Interpretation And Report Only  Completed

 

 2018  09593  EGD With Biopsy  Completed







Encounters







 Type  Date  Location  Provider  Dx  Diagnosis

 

 Office Visit  2018  [facility]  Taylor Perry PA  F31.9  Bipolar 
disorder,



   11:00a  [address]      unspecified









 F41.9  Anxiety disorder, unspecified

 

 F17.210  Nicotine dependence, cigarettes, uncomplicated

 

 J45.990  Exercise induced bronchospasm

 

 J30.9  Allergic rhinitis, unspecified









 Office Visit  2018 10:45a  Sadi Mckeon MD  R93.3  Abnormal 
findings on dx



           imaging of prt digestive



           tract









 R10.9  Unspecified abdominal pain









 Office Visit  2018  1:30p  Sadi Mckeon MD  R11.2  Nausea with 
vomiting,



           unspecified









 R93.3  Abnormal findings on dx imaging of prt digestive tract









 Office Visit  2018  2:15p  Sadi Mckeon,  R11.2  Nausea with



       MD    vomiting,



           unspecified

 

 Office Visit  2011 11:20a  [facility] Office  Ramon Bonilla M.D.  256.4  
Polycystic Ovaries

 

 Office Visit  2011  9:00a  OB/Gyn Office  Ramon Bonilla M.D.  256.4  
Polycystic Ovaries









 626.4  Irregular Menstrual Cycle

 

 704.1  Hirsutism







Plan of Treatment

Future Appointment(s):2019 11:15 am - Taylor Perry PA at [facility] [address] - Taylor Perry, PAF41.0 Panic disorder [episodic 
paroxysmal anxiety]F31.9 Bipolar disorder, unspecifiedNew Medication:Quetiapine 
Fumarate 100 mg - 1 tab by mouth at bedtime dailyGuanfacine HCL 1 mg - 1/2 by 
mouth twice a dayCitalopram Hydrobromide 20 mg - 1 by mouth every dayComments:
Plan to bridge medications until you can get re-established with [facility]. Will send 
rxs to the pharmacy.Adjustment of Seroquel to 100 mg bedtime.Follow up:1 month 
PE Pt. Resting comfortably in bed. No c/o pain at this time.